# Patient Record
Sex: FEMALE | Race: WHITE | Employment: UNEMPLOYED | URBAN - NONMETROPOLITAN AREA
[De-identification: names, ages, dates, MRNs, and addresses within clinical notes are randomized per-mention and may not be internally consistent; named-entity substitution may affect disease eponyms.]

---

## 2017-01-02 ENCOUNTER — OFFICE VISIT - GICH (OUTPATIENT)
Dept: FAMILY MEDICINE | Facility: OTHER | Age: 22
End: 2017-01-02

## 2017-01-02 ENCOUNTER — HISTORY (OUTPATIENT)
Dept: FAMILY MEDICINE | Facility: OTHER | Age: 22
End: 2017-01-02

## 2017-01-02 DIAGNOSIS — J02.9 ACUTE PHARYNGITIS: ICD-10-CM

## 2017-01-02 DIAGNOSIS — J00 ACUTE NASOPHARYNGITIS (COMMON COLD): ICD-10-CM

## 2017-01-02 LAB — STREP A ANTIGEN - HISTORICAL: NEGATIVE

## 2017-01-24 ENCOUNTER — HISTORY (OUTPATIENT)
Dept: EMERGENCY MEDICINE | Facility: OTHER | Age: 22
End: 2017-01-24

## 2017-02-26 ENCOUNTER — OFFICE VISIT - GICH (OUTPATIENT)
Dept: FAMILY MEDICINE | Facility: OTHER | Age: 22
End: 2017-02-26

## 2017-02-26 ENCOUNTER — HISTORY (OUTPATIENT)
Dept: FAMILY MEDICINE | Facility: OTHER | Age: 22
End: 2017-02-26

## 2017-02-26 ENCOUNTER — HOSPITAL ENCOUNTER (OUTPATIENT)
Dept: RADIOLOGY | Facility: OTHER | Age: 22
End: 2017-02-26
Attending: NURSE PRACTITIONER

## 2017-02-26 DIAGNOSIS — S52.601A CLOSED FRACTURE OF LOWER END OF RIGHT ULNA: ICD-10-CM
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
## 2017-02-26 DIAGNOSIS — S69.91XA UNSPECIFIED INJURY OF RIGHT WRIST, HAND AND FINGER(S), INITIAL ENCOUNTER: ICD-10-CM

## 2017-02-26 DIAGNOSIS — S52.501A CLOSED FRACTURE OF LOWER END OF RIGHT RADIUS: ICD-10-CM

## 2017-03-06 ENCOUNTER — HISTORY (OUTPATIENT)
Dept: FAMILY MEDICINE | Facility: OTHER | Age: 22
End: 2017-03-06

## 2017-03-06 ENCOUNTER — AMBULATORY - GICH (OUTPATIENT)
Dept: FAMILY MEDICINE | Facility: OTHER | Age: 22
End: 2017-03-06

## 2017-03-06 DIAGNOSIS — S52.201P: ICD-10-CM

## 2017-03-06 DIAGNOSIS — S52.91XP: ICD-10-CM

## 2017-03-06 DIAGNOSIS — Z01.810 ENCOUNTER FOR PREPROCEDURAL CARDIOVASCULAR EXAMINATION: ICD-10-CM

## 2017-03-06 LAB
ABSOLUTE BASOPHILS - HISTORICAL: 0.1 THOU/CU MM
ABSOLUTE EOSINOPHILS - HISTORICAL: 0.2 THOU/CU MM
ABSOLUTE LYMPHOCYTES - HISTORICAL: 2.7 THOU/CU MM (ref 0.9–2.9)
ABSOLUTE MONOCYTES - HISTORICAL: 1.1 THOU/CU MM
ABSOLUTE NEUTROPHILS - HISTORICAL: 7.3 THOU/CU MM (ref 1.7–7)
BASOPHILS # BLD AUTO: 0.8 %
EOSINOPHIL NFR BLD AUTO: 1.3 %
ERYTHROCYTE [DISTWIDTH] IN BLOOD BY AUTOMATED COUNT: 12.4 % (ref 11.5–15.5)
HCG UR QL: NEGATIVE
HCT VFR BLD AUTO: 40.2 % (ref 33–51)
HEMOGLOBIN: 13.9 G/DL (ref 12–16)
LYMPHOCYTES NFR BLD AUTO: 24 % (ref 20–44)
MCH RBC QN AUTO: 31 PG (ref 26–34)
MCHC RBC AUTO-ENTMCNC: 34.5 G/DL (ref 32–36)
MCV RBC AUTO: 90 FL (ref 80–100)
MONOCYTES NFR BLD AUTO: 9.6 %
NEUTROPHILS NFR BLD AUTO: 64.2 % (ref 42–72)
PLATELET # BLD AUTO: 325 THOU/CU MM (ref 140–440)
PMV BLD: 6.8 FL (ref 6.5–11)
RED BLOOD COUNT - HISTORICAL: 4.47 MIL/CU MM (ref 4–5.2)
WHITE BLOOD COUNT - HISTORICAL: 11.4 THOU/CU MM (ref 4.5–11)

## 2017-03-06 ASSESSMENT — PATIENT HEALTH QUESTIONNAIRE - PHQ9: SUM OF ALL RESPONSES TO PHQ QUESTIONS 1-9: 14

## 2017-03-09 ENCOUNTER — AMBULATORY - GICH (OUTPATIENT)
Dept: SCHEDULING | Facility: OTHER | Age: 22
End: 2017-03-09

## 2017-05-17 ENCOUNTER — COMMUNICATION - GICH (OUTPATIENT)
Dept: FAMILY MEDICINE | Facility: OTHER | Age: 22
End: 2017-05-17

## 2017-06-03 ENCOUNTER — HISTORY (OUTPATIENT)
Dept: EMERGENCY MEDICINE | Facility: OTHER | Age: 22
End: 2017-06-03

## 2017-07-03 ENCOUNTER — HISTORY (OUTPATIENT)
Dept: EMERGENCY MEDICINE | Facility: OTHER | Age: 22
End: 2017-07-03

## 2018-01-02 NOTE — NURSING NOTE
Patient Information     Patient Name MRN Meeta Tavarez [id number] Female 1995      Nursing Note by Gabby Barnes at 2017  2:45 PM     Author:  Gabby Barnes Service:  (none) Author Type:  (none)     Filed:  2017  1:54 PM Encounter Date:  2017 Status:  Signed     :  Gabby Barnes            Patient presents to clinic with cough, sore throat, fever (yesterday and day prior), laryngitis.  Onset 1 wk.  Pt is requesting RST today.  Both room mates are sick as well.  Gabby Barnes, MADDY ....................  2017   1:50 PM.

## 2018-01-02 NOTE — PATIENT INSTRUCTIONS
Patient Information     Patient Name MRMeeta Rahman [id number] Female 1995      Patient Instructions by Nikole Lara NP at 2017  2:45 PM     Author:  Nikole Lara NP Service:  (none) Author Type:  PHYS- Nurse Practitioner     Filed:  2017  2:14 PM Encounter Date:  2017 Status:  Signed     :  Nikole Lara NP (PHYS- Nurse Practitioner)            Cold Medicines   What are cold medicines?  Symptoms of the common cold start gradually over several days and usually last about two weeks. Symptoms may include sneezing, a stuffy or runny nose, sore throat, cough, watery eyes, mild headache, or body aches. A cold will go away on its own without treatment. However, there are many nonprescription products that may help relieve some of the symptoms of a cold. Cold medicines often contain more than one ingredient and are used to treat more than one symptom. Read the labels and buy products that have only the ingredients that you need. If you are not sure which medicine is best, ask your pharmacist.  How do they work?  Decongestants reduce swelling in your nose and sinuses. They may also lessen the amount of mucus made by your nose. If you use decongestants more often than directed, your stuffy nose may get worse.   Antihistamines block the effect of histamine. Histamine is a chemical your body makes when you have an allergic reaction. Antihistamines are most often used to treat itchy or watery eyes or a stuffy or runny nose caused by an allergy. Antihistamines may not help a stuffy or runny nose caused by a cold because they can make mucus thick and dry.  Mucolytics are medicines that make mucus thinner so that it is easier to cough up out of your throat and lungs.  Expectorants are cough medicines that may help to keep the mucus thin and bring up mucus from the lungs when you cough. This may relieve chest congestion and make it easier to breathe.   Cough suppressants  (antitussives) are medicines that lessen the urge to cough. They may give relief from a dry, hacking cough. If you have a cough that is wet sounding and produces mucus, it is important for you to cough the mucus up out of your lungs. For this reason, cough suppressants are not recommended for a wet sounding cough.  Fever and pain relievers, such as acetaminophen, aspirin, or other nonsteroidal anti-inflammatory drugs (NSAIDs), are often included in cold medicine. Read labels carefully to avoid taking more medicine than you need.  What else do I need to know about this medicine?    Talk to your healthcare provider if your symptoms start suddenly or you have severe symptoms. This may mean you have something more serious than a cold.    Follow the directions that come with your medicine, including information about food or alcohol. Make sure you know how and when to take your medicine. Do not take more or less than you are supposed to take.    Try to get all of your medicine at the same place. Your pharmacist can help make sure that all of your medicines are safe to take together.    Keep a list of your medicines with you. List all of the prescription medicines, nonprescription medicines, supplements, natural remedies, and vitamins that you take. Tell all healthcare providers who treat you about all of the products you are taking.    Many medicines have side effects. A side effect is a symptom or problem that is caused by the medicine. Ask your healthcare provider or pharmacist what side effects the medicine may cause and what you should do if you have side effects.    Nonsteroidal anti-inflammatory medicines (NSAIDs), such as ibuprofen, naproxen, and aspirin, may cause stomach bleeding and other problems. These risks increase with age. Read the label and take as directed. Unless recommended by your healthcare provider, do not take for more than 10 days for any reason.    Acetaminophen may cause liver damage or other  problems. Unless recommended by your provider, don't take more than 3000 milligrams (mg) in 24 hours. To make sure you don t take too much, check other medicines you take to see if they also contain acetaminophen. Ask your provider if you need to avoid drinking alcohol while taking this medicine.  If you have any questions, ask your healthcare provider or pharmacist for more information. Be sure to keep all appointments for provider visits or tests.

## 2018-01-03 NOTE — NURSING NOTE
Patient Information     Patient Name MRMeeta Rahman [id number] Female 1995      Nursing Note by Cecile Lakhani at 2017 10:30 AM     Author:  Cecile Lakhani Service:  (none) Author Type:  (none)     Filed:  2017 11:37 AM Encounter Date:  2017 Status:  Signed     :  Cecile Lakhani            Patient presents for a right wrist injury. States that she fell in the ice on Friday landing on right hand  Cecile Lakhani LPN   2017  11:29 AM

## 2018-01-03 NOTE — PATIENT INSTRUCTIONS
Patient Information     Patient Name MRN Meeta Tavarez [id number] Female 1995      Patient Instructions by Rosio Rashid PA-C at 3/6/2017 12:45 PM     Author:  Rosio Rashid PA-C Service:  (none) Author Type:  PHYS- Physician Assistant     Filed:  3/6/2017  1:56 PM Encounter Date:  3/6/2017 Status:  Signed     :  Rosio Rashid PA-C (PHYS- Physician Assistant)            Patient should take the following medications the morning of surgery with a small sip of water: hold off on all meds.  Patient was instructed to hold the following medications the morning of surgery: hold off on all meds.     Patient was advised to call our office and the surgical services with any change in condition or new symptoms if they were to develop between today and their surgical date.  Especially any cardiopulmonary symptoms or symptoms concerning for an infection.     Discontinue aspirin, aleve, naproxen and ibuprofen 7 days prior to surgery to reduce bleeding risk.  It is fine to take tylenol the week before surgery.

## 2018-01-03 NOTE — NURSING NOTE
"Patient Information     Patient Name MRMeeta Rahman [id number] Female 1995      Nursing Note by Soumya Royal at 3/6/2017 12:45 PM     Author:  Soumya Royal Service:  (none) Author Type:  (none)     Filed:  3/6/2017  1:44 PM Encounter Date:  3/6/2017 Status:  Signed     :  Soumya Royal            Date of Surgery: 3/9/17  Type of Surgery: fracture repair of right wrist  Surgeon: Dr Banks  Hospital[facility]  [facility]       Fever/Chills or other infectious symptoms in past month: no  >10lb weight loss in past two months: no    Health Care Directive/Code status:  Full Code  Hx of blood transfusions:   (NO)   Td up to date:  10/30/2009  History of VRE/MRSA:  (NO) Date: N/A    Preoperative Evaluation: Obstructive Sleep Apnea screening    S: Snore -  Do you snore loudly? (louder than talking or loud enough to be heard through closed doors)(NO)  T: Tired - Do you often feel tired, fatigued, or sleepy during the daytime?(YES)  O: Observed - Has anyone ever observed you stop breathing during your sleep?(NO)  P: Pressure - Do you have or are you being treated for high blood pressure?(NO)  B: BMI - BMI greater than 35kg/m2?(YES)  A: Age - Age over 50 years old?(NO)  N: Neck - Neck circumference greater than 40 cm?(NO)  G: Gender - Gender: Male?(NO)    Total number of \"YES\" responses:  2    Scoring: Low risk of JAVED 0-2  At Risk of JAVED: >3 High Risk of JAVED: 5-8    Soumya Royal LPN........................3/6/2017  1:22 PM              "

## 2018-01-03 NOTE — PATIENT INSTRUCTIONS
Patient Information     Patient Name MRN Meeta Tavarez [id number] Female 1995      Patient Instructions by Stephanie Sharma NP at 2017 10:30 AM     Author:  Stephanie Sharma NP Service:  (none) Author Type:  PHYS- Nurse Practitioner     Filed:  2017 12:08 PM Encounter Date:  2017 Status:  Signed     :  Stephanie Sharma NP (PHYS- Nurse Practitioner)            The x-ray today showed a fracture of both ulnar bone and radius bone. Radiologist will review the X-ray within 24-48 hours, I will contact you if the radiologist finds anything of significance on the x-ray that I did not see.    You will likely need surgery.    You will need to see orthopedics as soon as possible.    Rest the right hand, wrist, avoid any activity which causes pain.  NO ACTIVITY with right.    Apply cold packs to the affected area for 15-20 minutes, 4 times a day. A bag of frozen corn or peas often works well as a cold pack. A cold pack is usually the best treatment for the 1st 2 days after an injury. After 48 hours, apply heat or ice, whichever gives relief.    If the fingers beyond the Ace wrap is swollen, cool or darker in color than the opposite side, the bandage might be too tight.    Leave splint on at all times     Elevate the injured area as much as you are able. If you can get this higher than the heart, this will help minimize pain and swelling.    Also, Take ibuprofen (Advil, Motrin) or naproxen (Aleve), or a similar prescription medication. Use regularly for the first 7-10 days. Later, take as needed for pain, swelling or stiffness. Take this type of medication with food to minimize any stomach irritation.     Hydrocodone 1 tab every 4 hours as needed for severe pain    Call or return to clinic as needed if your pain becomes significantly worse, or fails to improve as anticipated despite following the above recommendations.

## 2018-01-03 NOTE — H&P
"Patient Information     Patient Name MRN Meeta Tavarez [id number] Female 1995      H&P by Rosio Rashid PA-C at 3/6/2017 12:45 PM     Author:  Rosio Rashid PA-C Service:  (none) Author Type:  PHYS- Physician Assistant     Filed:  3/6/2017  3:25 PM Encounter Date:  3/6/2017 Status:  Signed     :  Rosio Rashid PA-C (JERRY- Physician Assistant)            ----------------- PREOPERATIVE EXAM ------------------  3/6/2017    SUBJECTIVE:  Meeta Palacios is a 21 y.o. female here for preoperative optimization.    I was asked to see Meeta Palacios by Dr. Banks for a preoperative optimization due to general health and anesthesia risk.     Patient has a history of right wrist fracture. Bilateral radius and ulna fracture on 2017.   Otherwise feeling well. No fevers, chills, chest pain, palpitations, problems breathing, GI symptoms, urinary symptoms.     Nursing Notes:   Soumya Royal  3/6/2017  1:44 PM  Signed  Date of Surgery: 3/9/17  Type of Surgery: fracture repair of right wrist  Surgeon: Dr Banks  Hospital:  [facility]       Fever/Chills or other infectious symptoms in past month: no  >10lb weight loss in past two months: no    Health Care Directive/Code status:  Full Code  Hx of blood transfusions:   (NO)   Td up to date:  10/30/2009  History of VRE/MRSA:  (NO) Date: N/A    Preoperative Evaluation: Obstructive Sleep Apnea screening    S: Snore -  Do you snore loudly? (louder than talking or loud enough to be heard through closed doors)(NO)  T: Tired - Do you often feel tired, fatigued, or sleepy during the daytime?(YES)  O: Observed - Has anyone ever observed you stop breathing during your sleep?(NO)  P: Pressure - Do you have or are you being treated for high blood pressure?(NO)  B: BMI - BMI greater than 35kg/m2?(YES)  A: Age - Age over 50 years old?(NO)  N: Neck - Neck circumference greater than 40 cm?(NO)  G: Gender - Gender: Male?(NO)    Total number of \"YES\" " responses:  2    Scoring: Low risk of JAVED 0-2  At Risk of JAVED: >3 High Risk of JAVED: 5-8    Soumya Royal LPN........................3/6/2017  1:22 PM          Patient Active Problem List      Diagnosis Date Noted     VERTIGO 08/31/2010     ADHD 03/14/2006       Past Medical History     Diagnosis  Date     ADHD 3/14/2006       Past Surgical History       Procedure   Laterality Date     Pulpotomy        root canal, and crown.        Hand finger surgery   2010     right pinky, fracture at growth plate         Current Outpatient Prescriptions       Medication  Sig Dispense Refill     HYDROcodone-acetaminophen, 7.5-325 mg, (NORCO 7.5-325) 7.5-325 mg per tablet Take 1 tablet by mouth every 4 hours if needed  for Pain Max acetaminophen dose: 4000mg in 24 hrs. 20 tablet 0     No current facility-administered medications for this visit.      Medications have been reviewed by me and are current to the best of my knowledge and ability.    Recent use of: no recent use of aspirin (ASA) or steroids. Last intake of ibuprofen was on 3/6/2017. Encouraged to hold further use of ibuprofen prior to surgery.      Allergies:  Allergies     Allergen  Reactions     Latex Rash       Latex allergy  yes   Tape allergy: no    Family History       Problem   Relation Age of Onset     Other  Mother      headaches with visual changes, dizziness, nausea, and fatigue       Hypertension  Maternal Grandfather      Other  Maternal Grandfather      high cholesterol         Denies family hx of bleeding tendencies, anesthesia complications, or other problems with surgery.      Social History        Substance Use Topics          Smoking status:   Current Some Day Smoker      Types:  Cigarettes      Smokeless tobacco:   Never Used      Alcohol use   3.6 oz/week     0 Standard drinks or equivalent, 3 Glasses of wine, 3 Shots of liquor per week        Comment: monthly           ROS:    surgical:  patient denies previous complications from prior surgeries  "including but not limited to prolonged bleeding, anesthesia complications, dysrhythmias, surgical wound infections, or prolonged hospital stay.      REVIEW OF SYSTEMS:  A comprehensive review of systems was negative except for items noted in HPI/Subjective.    Constitutional: Negative for fever , chills  and fatigue .   Eyes: Negative for irritation  and redness .  Ears, nose, mouth, throat, and face: Negative for ear drainage , nasal congestion , sore throat and hoarseness .  Respiratory: Negative for cough, sputum production , hemoptysis, dyspnea  and wheezing .  Cardiovascular: Negative for chest discomfort, palpitations and lightheadedness .  Gastrointestinal: Negative for dysphagia , nausea , vomiting , melena , diarrhea , constipation  and abdominal pain.  Genitourinary:Negative for frequency, dysuria , urinary incontinence , hematuria.  Integument/breast: Negative for rash .   Hematologic/lymphatic: Negative for easy bruising , bleeding, lymphadenopathy  and petechiae.   Musculoskeletal:Negative for myalgias and arthralgias .  Neurological: Negative for headaches, dizziness , vertigo , seizures  and weakness.   Psychiatric: Negative for anxiety , depression , panic attacks  and suicidal ideations .        -------------------------------------------------------------    PHYSICAL EXAM:  /66  Pulse 76  Temp 99.1  F (37.3  C) (Tympanic)  Ht 1.537 m (5' 0.5\")  Wt 92.5 kg (204 lb)  LMP 02/17/2017  SpO2 96%  Breastfeeding? No  BMI 39.19 kg/m2    EXAM:  General Appearance: Pleasant, alert, appropriate appearance for age. No acute distress  Head Exam: Normal. Normocephalic, atraumatic.  Eye Exam: Normal external eye, conjunctiva, lids, cornea. BINU.  Ear Exam: Normal TM's bilaterally. Normal auditory canals and external ears. Non-tender.  Nose Exam: Normal external nose, mucus membranes, and septum.  OroPharynx Exam: Dental hygiene adequate. Normal buccal mucosa. Normal pharynx.  Neck Exam: Supple, no masses or " nodes., no lymphadenopathy  Thyroid Exam: Normal., No nodules or enlargement., normal to palpation  Chest/Respiratory Exam: Normal chest wall and respirations. Clear to auscultation.  Cardiovascular Exam: Regular rate and rhythm. S1, S2, no murmur, click, gallop, or rubs.  Gastrointestinal Exam: Soft, nontender, no abnormal masses or organomegaly. BS x 4.  Lymphatic Exam: Normal.  Musculoskeletal Exam: Back is straight and non-tender, full ROM of left upper and bilateral lower extremities. Right upper extremity is in a cast.   Skin: no rash or abnormalities  Neurologic Exam:symmetric DTRs, normal gross motor movement, tone, and coordination. No tremor.  Psychiatric Exam: Alert and oriented, appropriate affect.    PHQ Depression Screen  Date of PHQ exam: 17  Over the last 2 weeks, how often have you been bothered by any of the following problems?  1. Little interest or pleasure in doing things: 0 - Not at all  2. Feeling down, depressed, or hopeless: 2 - More than half the days  3. Trouble falling or staying asleep, or sleeping too much: 2 - More than half the days  4. Feeling tired or having little energy: 1 - Several days  5. Poor appetite or overeatin - Several days  6. Feeling bad about yourself - or that you are a failure or have let yourself or your family down: 3 - Nearly every day  7. Trouble concentrating on things, such as reading the newspaper or watching television: 2 - More than half the days  8. Moving or speaking so slowly that other people could have noticed. Or the opposite - being so fidgety or restless that you have been moving around a lot more than usual: 2 - More than half the days  9. Thoughts that you would be better off dead, or of hurting yourself in some way: 1 - Several days    PHQ-9 TOTAL SCORE: (!) 14  Depression Severity Level: moderate  If any answers were positive, how difficult have these problems made it for you to do your work, take care of things at home, or get along  with other people: somewhat difficult    Patient can walk up a flight of stairs without becoming short of breath or having chest pain: YES   Patient is able to tolerate greater than 4 METs of activity without any cardiopulmonary symptoms.    LABS:    Results for orders placed or performed in visit on 03/06/17       PREGNANCY URINE       Result  Value Ref Range Status    PREGNANCY,URINE           Negative Negative Final   CBC WITH AUTO DIFFERENTIAL       Result  Value Ref Range Status    WHITE BLOOD COUNT         11.4 (H) 4.5 - 11.0 thou/cu mm Final    RED BLOOD COUNT           4.47 4.00 - 5.20 mil/cu mm Final    HEMOGLOBIN                13.9 12.0 - 16.0 g/dL Final    HEMATOCRIT                40.2 33.0 - 51.0 % Final    MCV                       90 80 - 100 fL Final    MCH                       31.0 26.0 - 34.0 pg Final    MCHC                      34.5 32.0 - 36.0 g/dL Final    RDW                       12.4 11.5 - 15.5 % Final    PLATELET COUNT            325 140 - 440 thou/cu mm Final    MPV                       6.8 6.5 - 11.0 fL Final    NEUTROPHILS               64.2 42.0 - 72.0 % Final    LYMPHOCYTES               24.0 20.0 - 44.0 % Final    MONOCYTES                 9.6 <12.0 % Final    EOSINOPHILS               1.3 <8.0 % Final    BASOPHILS                 0.8 <3.0 % Final    ABSOLUTE NEUTROPHILS      7.3 (H) 1.7 - 7.0 thou/cu mm Final    ABSOLUTE LYMPHOCYTES      2.7 0.9 - 2.9 thou/cu mm Final    ABSOLUTE MONOCYTES        1.1 (H) <0.9 thou/cu mm Final    ABSOLUTE EOSINOPHILS      0.2 <0.5 thou/cu mm Final    ABSOLUTE BASOPHILS        0.1 <0.3 thou/cu mm Final         CXR:  Not necessary    EKG:  Not necessary  ---------------------------------------------------------------    No family history of problems with bleeding or anesthetia.    ASA PS class 1. Patient's perioperative risk is minimized and no further cardiopulmonary workup is neccesary.  Please contact the office with any questions or  concerns.      ICD-10-CM    1. Preop cardiovascular exam Z01.810 CBC AND DIFFERENTIAL      PREGNANCY URINE      HYDROcodone-acetaminophen, 7.5-325 mg, (NORCO 7.5-325) 7.5-325 mg per tablet      CBC AND DIFFERENTIAL      PREGNANCY URINE      CBC WITH AUTO DIFFERENTIAL   2. Fracture of right radius and ulna, closed, with malunion, subsequent encounter S52.201P CBC AND DIFFERENTIAL     S52.91XP PREGNANCY URINE      HYDROcodone-acetaminophen, 7.5-325 mg, (NORCO 7.5-325) 7.5-325 mg per tablet      CBC AND DIFFERENTIAL      PREGNANCY URINE      CBC WITH AUTO DIFFERENTIAL       ASSESSEMNT AND PLAN:  1.  Preoperative history and physical   consults:  None  Patient is approved for the surgery.  No concerns.     For above listed surgery and anesthesia:     - Patient is low  risk for perioperative complications.     Patient was administered the following vaccines today: none.  Completed labs today: CBC, UPT, no concerns.    Refilled norco 7.5/325 today for a quantity of 20. Further refills and pain management need to be completed by surgeon.     PRE OP RECOMMENDATIONS:  Patient is on chronic pain medications (NO)   Patient is on antiplatlet/anticoagulation (NO)   Other medications that need adjustment perioperatively (NO)     Patient Instructions   Patient should take the following medications the morning of surgery with a small sip of water: hold off on all meds.  Patient was instructed to hold the following medications the morning of surgery: hold off on all meds.     Patient was advised to call our office and the surgical services with any change in condition or new symptoms if they were to develop between today and their surgical date.  Especially any cardiopulmonary symptoms or symptoms concerning for an infection.     Discontinue aspirin, aleve, naproxen and ibuprofen 7 days prior to surgery to reduce bleeding risk.  It is fine to take tylenol the week before surgery.        Other:  Patient was advised to call our office  and the surgical services with any change in condition or new symptoms if they were to develop between today and their surgical date.  Especially any cardiopulmonary symptoms or symptoms concerning for an infection.     PRE OP RECOMMENDATIONS:  Discontinue ASA 7 days prior to reduce bleeding risk and Discontinue NSAIDS 7 days prior to procedure to reduce bleeding risk    Greater than 25 minutes were spent in counseling and coordination of care.    Rosio Rashid PA-C..................3/6/2017 1:44 PM

## 2018-01-03 NOTE — PROGRESS NOTES
Patient Information     Patient Name MRN Sex Meeta Calero [id number] Female 1995      Progress Notes by Stephanie Sharma NP at 2017 10:30 AM     Author:  Stephanie Sharma NP Service:  (none) Author Type:  PHYS- Nurse Practitioner     Filed:  2017  1:33 PM Encounter Date:  2017 Status:  Signed     :  Stephanie Sharma NP (PHYS- Nurse Practitioner)            HPI:    Meeta Palacios is a 21 y.o. female who presents to clinic today for right wrist pain/injury.   States she slipped and fell backwards on ice 2 days ago, landed on right hand.  Lots of swelling and bruising.  Pain is severe.  Pain in hand, wrist and forearm.  Tingling.  No numbness.  Unable to use the right hand or move the right hand/wrist.  Right handed.   Denies any other injuries.  Iced some.  Some elevation.  Taking Ibuprofen 600 mg every 6 hours without relief.            Past Medical History    Diagnosis Date     Past Surgical History       Procedure   Laterality Date     Pulpotomy        root canal, and crown.        Hand finger surgery   2010     right pinky, fracture at growth plate       Social History      Substance Use Topics        Smoking status:  Current Every Day Smoker     Types: Cigarettes     Smokeless tobacco:  Never Used     Alcohol use  No     No current outpatient prescriptions on file.     No current facility-administered medications for this visit.      Medications have been reviewed by me and are current to the best of my knowledge and ability.    No Known Allergies    ROS:  Refer to HPI    Visit Vitals       /72     Pulse 88     Wt 91.6 kg (202 lb)     LMP 2017     Breastfeeding No       EXAM:  General Appearance: Miserable appearing female adolescent, appropriate appearance for age. No acute distress  Respiratory:   Normal effort.   Cardiovascular:  CMS intact to right upper extremity, brisk capillary refill (immediate), strong radial pulse   Musculoskeletal:  Right dorsal  hand with generalized edema and swelling.  Right wrist and distal forearm with generalized swelling and edema.  Right fingers puffy.  Generalized tenderness to palpation over right hand, wrist and forearm.  Able to wiggle fingers with encouragement.  No active ROM of right wrist due to guarding and pain.  Right elbow without swelling or tenderness.  Normal ROM of right elbow.    Dermatological: dorsal hand with generalized bruising.  Right wrist palmar side with bruising along radial side.  Right wrist dorsal side with bruising along ulnar side.  Psychological: normal affect, alert and pleasant    Exam: XR WRIST 3 VIEWS RIGHT  History: Right wrist injury, initial encounter  Technique: 3 views.  Findings: There is a transverse impacted fracture through the distal radius with mild dorsal displacement of the distal radial articular surface. There is no significant angulation.  There is also a mildly distracted fracture through the ulnar styloid.  No other abnormality is seen.  Impression: Acute fractures of the distal radius and ulna.  Electronically Signed By: Yin Woodward M.D. on 2/26/2017 12:33 PM      ASSESSMENT/PLAN:    ICD-10-CM    1. Right wrist injury, initial encounter S69.91XA ketorolac 60 mg injection (TORADOL)      XR WRIST 3 VIEWS RIGHT   2. Fall from slipping on ice, initial encounter W00.9XXA    3. Radius/ulna fracture, right, closed, initial encounter S52.501A AMB CONSULT TO ORTHOPEDICS - AFFILIATE ONLY     S52.601A HYDROcodone-acetaminophen, 7.5-325 mg, (NORCO 7.5-325) 7.5-325 mg per tablet      SLING         Xray of right wrist completed and reviewed, radial and ulnar fracture noted, radiologist confirmed  Manual traction applied during splinting of right wrist with long arm sugar tong splint with thumb and index finger tilted radially and wrist straight  Sling for comfort and elevation.  Leave splint in place at all times.    Stressed importance of elevation to reduce swelling  Discussed  warning signs/symptoms such as cyanosis, numbness, inability to move fingers, splint too tight, etc  - instructed to go to ER if occur  Norco 7.5/325 mg 1 tab Q 4 hours PRN (20 tabs, no refills)  Naproxen or  ibuprofen TID PRN  Referral to surgical orthopedics - needs to be seen in 1-2 days          Patient Instructions   The x-ray today showed a fracture of both ulnar bone and radius bone. Radiologist will review the X-ray within 24-48 hours, I will contact you if the radiologist finds anything of significance on the x-ray that I did not see.    You will likely need surgery.    You will need to see orthopedics as soon as possible.    Rest the right hand, wrist, avoid any activity which causes pain.  NO ACTIVITY with right.    Apply cold packs to the affected area for 15-20 minutes, 4 times a day. A bag of frozen corn or peas often works well as a cold pack. A cold pack is usually the best treatment for the 1st 2 days after an injury. After 48 hours, apply heat or ice, whichever gives relief.    If the fingers beyond the Ace wrap is swollen, cool or darker in color than the opposite side, the bandage might be too tight.    Leave splint on at all times     Elevate the injured area as much as you are able. If you can get this higher than the heart, this will help minimize pain and swelling.    Also, Take ibuprofen (Advil, Motrin) or naproxen (Aleve), or a similar prescription medication. Use regularly for the first 7-10 days. Later, take as needed for pain, swelling or stiffness. Take this type of medication with food to minimize any stomach irritation.     Hydrocodone 1 tab every 4 hours as needed for severe pain    Call or return to clinic as needed if your pain becomes significantly worse, or fails to improve as anticipated despite following the above recommendations.

## 2018-01-05 NOTE — TELEPHONE ENCOUNTER
Patient Information     Patient Name MRN Meeta Tavarez [id number] Female 1995      Telephone Encounter by Sonia Max MD at 2017  5:08 PM     Author:  Sonia Max MD Service:  (none) Author Type:  Physician     Filed:  2017  5:08 PM Encounter Date:  2017 Status:  Signed     :  Sonia Max MD (Physician)            She should follow up with a surgeon.  Sonia Max MD

## 2018-01-05 NOTE — TELEPHONE ENCOUNTER
Patient Information     Patient Name MRN Meeta Tavarez [id number] Female 1995      Telephone Encounter by Jayne Torres at 2017  2:29 PM     Author:  Jayne Torres Service:  (none) Author Type:  (none)     Filed:  2017  2:30 PM Encounter Date:  2017 Status:  Signed     :  Jayne Torres            Patient wants okay to see you for her post op . She had right wrist surgery with DR. Banks .  Jayne Torres LPN ....................2017  2:30 PM

## 2018-01-05 NOTE — TELEPHONE ENCOUNTER
Patient Information     Patient Name Meeta Rhodes [id number] Female 1995      Telephone Encounter by Cande Sharma at 2017 10:55 AM     Author:  Cande Sharma Service:  (none) Author Type:  (none)     Filed:  2017 10:55 AM Encounter Date:  2017 Status:  Signed     :  Cande Sharma            Talked to patient and she is aware that she is to see her surgeon.   Cande Sharma LPN..............2017 10:55 AM

## 2018-01-27 VITALS
SYSTOLIC BLOOD PRESSURE: 128 MMHG | HEART RATE: 76 BPM | OXYGEN SATURATION: 96 % | TEMPERATURE: 99.1 F | DIASTOLIC BLOOD PRESSURE: 66 MMHG | BODY MASS INDEX: 38.51 KG/M2 | WEIGHT: 204 LBS | HEIGHT: 61 IN

## 2018-01-27 VITALS
DIASTOLIC BLOOD PRESSURE: 76 MMHG | WEIGHT: 201 LBS | BODY MASS INDEX: 37.95 KG/M2 | TEMPERATURE: 99.2 F | HEART RATE: 104 BPM | OXYGEN SATURATION: 98 % | HEIGHT: 61 IN | RESPIRATION RATE: 20 BRPM | SYSTOLIC BLOOD PRESSURE: 118 MMHG

## 2018-01-27 VITALS — WEIGHT: 202 LBS | DIASTOLIC BLOOD PRESSURE: 72 MMHG | SYSTOLIC BLOOD PRESSURE: 108 MMHG | HEART RATE: 88 BPM

## 2018-02-04 ASSESSMENT — PATIENT HEALTH QUESTIONNAIRE - PHQ9: SUM OF ALL RESPONSES TO PHQ QUESTIONS 1-9: 14

## 2018-02-12 ENCOUNTER — DOCUMENTATION ONLY (OUTPATIENT)
Dept: FAMILY MEDICINE | Facility: OTHER | Age: 23
End: 2018-02-12

## 2018-02-12 PROBLEM — Z34.80 PRENATAL CARE OF MULTIGRAVIDA, ANTEPARTUM: Status: ACTIVE | Noted: 2018-01-05

## 2018-07-23 NOTE — PROGRESS NOTES
Patient Information     Patient Name  Meeta Palacios MRN  [id number] Sex  Female   1995      Letter by Rosio Rashid PA-C at      Author:  Rosio Rashid PA-C Service:  (none) Author Type:  (none)    Filed:   Encounter Date:  3/6/2017 Status:  (Other)         Meeta Palacios  [address]  [address] [facility] [address]    3/6/2017      Dear Ms. Palacios,      We've received the results back from the laboratory for the samples you gave in clinic.  Your labs are normal. Please contact us at [phone] with any questions or concerns that you have.    I attached your lab results for your records.        Take Care,         Rosio Rashid PA-C    Resulted Orders      PREGNANCY URINE (Collected: 3/6/2017  2:16 PM)      Result  Value Ref Range    PREGNANCY,URINE           Negative Negative   CBC WITH AUTO DIFFERENTIAL (Collected: 3/6/2017  2:16 PM)      Result  Value Ref Range    WHITE BLOOD COUNT         11.4 (H) 4.5 - 11.0 thou/cu mm    RED BLOOD COUNT           4.47 4.00 - 5.20 mil/cu mm    HEMOGLOBIN                13.9 12.0 - 16.0 g/dL    HEMATOCRIT                40.2 33.0 - 51.0 %    MCV                       90 80 - 100 fL    MCH                       31.0 26.0 - 34.0 pg    MCHC                      34.5 32.0 - 36.0 g/dL    RDW                       12.4 11.5 - 15.5 %    PLATELET COUNT            325 140 - 440 thou/cu mm    MPV                       6.8 6.5 - 11.0 fL    NEUTROPHILS               64.2 42.0 - 72.0 %    LYMPHOCYTES               24.0 20.0 - 44.0 %    MONOCYTES                 9.6 <12.0 %    EOSINOPHILS               1.3 <8.0 %    BASOPHILS                 0.8 <3.0 %    ABSOLUTE NEUTROPHILS      7.3 (H) 1.7 - 7.0 thou/cu mm    ABSOLUTE LYMPHOCYTES      2.7 0.9 - 2.9 thou/cu mm    ABSOLUTE MONOCYTES        1.1 (H) <0.9 thou/cu mm    ABSOLUTE EOSINOPHILS      0.2 <0.5 thou/cu mm    ABSOLUTE BASOPHILS        0.1 <0.3 thou/cu mm

## 2018-08-15 ENCOUNTER — TELEPHONE (OUTPATIENT)
Dept: FAMILY MEDICINE | Facility: OTHER | Age: 23
End: 2018-08-15

## 2018-08-15 NOTE — TELEPHONE ENCOUNTER
Michelle from [facility] called requesting a referral for this patients July visits. Michelle did not know why the patient was there, but speculated that she may have moved to that area. She said the patient is/was pregnant and had been seen there in July for OB visits. She said Meeta has since changed insurance, but she had [employer] at that time. DX Codes she provided were Z34.90, O09.893, O99.343 and Z38.33. Will you approve this referral? Thank you, Vira Tenorio on 8/15/2018 at 10:19 AM

## 2018-08-15 NOTE — TELEPHONE ENCOUNTER
No I will not authorize this I have not seen the patient for at least 2 years.  Darleen Delgado MD  12:04 PM 8/15/2018

## 2018-08-15 NOTE — TELEPHONE ENCOUNTER
Left message stating that they would not refer patient because we have not seen the patient for at least 2 years.  Princess Weber LPN .......8/15/2018 12:40 PM

## 2018-08-15 NOTE — TELEPHONE ENCOUNTER
I haven't seen this patient.  Looks like she used to see Darleen Delgado MD.  I will not authorize visits.  Sonia Max MD

## 2018-11-24 ENCOUNTER — HOSPITAL ENCOUNTER (EMERGENCY)
Facility: CLINIC | Age: 23
Discharge: HOME | End: 2018-11-24
Attending: EMERGENCY MEDICINE | Admitting: EMERGENCY MEDICINE
Payer: COMMERCIAL

## 2018-11-24 VITALS
DIASTOLIC BLOOD PRESSURE: 60 MMHG | WEIGHT: 165 LBS | BODY MASS INDEX: 31.15 KG/M2 | HEIGHT: 61 IN | OXYGEN SATURATION: 100 % | SYSTOLIC BLOOD PRESSURE: 107 MMHG | TEMPERATURE: 97.8 F

## 2018-11-24 DIAGNOSIS — G44.219 EPISODIC TENSION-TYPE HEADACHE, NOT INTRACTABLE: ICD-10-CM

## 2018-11-24 DIAGNOSIS — H81.10 BENIGN PAROXYSMAL POSITIONAL VERTIGO, UNSPECIFIED LATERALITY: ICD-10-CM

## 2018-11-24 DIAGNOSIS — R11.0 NAUSEA: ICD-10-CM

## 2018-11-24 LAB
ALBUMIN SERPL-MCNC: 3.9 G/DL (ref 3.4–5)
ALP SERPL-CCNC: 64 U/L (ref 40–150)
ALT SERPL W P-5'-P-CCNC: 30 U/L (ref 0–50)
ANION GAP SERPL CALCULATED.3IONS-SCNC: 5 MMOL/L (ref 3–14)
AST SERPL W P-5'-P-CCNC: 19 U/L (ref 0–45)
BASOPHILS # BLD AUTO: 0.1 10E9/L (ref 0–0.2)
BASOPHILS NFR BLD AUTO: 0.5 %
BILIRUB SERPL-MCNC: 0.3 MG/DL (ref 0.2–1.3)
BUN SERPL-MCNC: 15 MG/DL (ref 7–30)
CALCIUM SERPL-MCNC: 8.3 MG/DL (ref 8.5–10.1)
CHLORIDE SERPL-SCNC: 109 MMOL/L (ref 94–109)
CO2 SERPL-SCNC: 26 MMOL/L (ref 20–32)
CREAT SERPL-MCNC: 0.82 MG/DL (ref 0.52–1.04)
DIFFERENTIAL METHOD BLD: NORMAL
EOSINOPHIL # BLD AUTO: 0.1 10E9/L (ref 0–0.7)
EOSINOPHIL NFR BLD AUTO: 1.3 %
ERYTHROCYTE [DISTWIDTH] IN BLOOD BY AUTOMATED COUNT: 12.3 % (ref 10–15)
GFR SERPL CREATININE-BSD FRML MDRD: 87 ML/MIN/1.7M2
GLUCOSE SERPL-MCNC: 79 MG/DL (ref 70–99)
HCT VFR BLD AUTO: 40.4 % (ref 35–47)
HGB BLD-MCNC: 13.5 G/DL (ref 11.7–15.7)
IMM GRANULOCYTES # BLD: 0 10E9/L (ref 0–0.4)
IMM GRANULOCYTES NFR BLD: 0.2 %
LYMPHOCYTES # BLD AUTO: 3.3 10E9/L (ref 0.8–5.3)
LYMPHOCYTES NFR BLD AUTO: 36 %
MCH RBC QN AUTO: 29.9 PG (ref 26.5–33)
MCHC RBC AUTO-ENTMCNC: 33.4 G/DL (ref 31.5–36.5)
MCV RBC AUTO: 90 FL (ref 78–100)
MONOCYTES # BLD AUTO: 0.8 10E9/L (ref 0–1.3)
MONOCYTES NFR BLD AUTO: 9 %
NEUTROPHILS # BLD AUTO: 4.9 10E9/L (ref 1.6–8.3)
NEUTROPHILS NFR BLD AUTO: 53 %
NRBC # BLD AUTO: 0 10*3/UL
NRBC BLD AUTO-RTO: 0 /100
PLATELET # BLD AUTO: 318 10E9/L (ref 150–450)
POTASSIUM SERPL-SCNC: 3.9 MMOL/L (ref 3.4–5.3)
PROT SERPL-MCNC: 7.9 G/DL (ref 6.8–8.8)
RBC # BLD AUTO: 4.51 10E12/L (ref 3.8–5.2)
SODIUM SERPL-SCNC: 140 MMOL/L (ref 133–144)
WBC # BLD AUTO: 9.2 10E9/L (ref 4–11)

## 2018-11-24 PROCEDURE — 96375 TX/PRO/DX INJ NEW DRUG ADDON: CPT

## 2018-11-24 PROCEDURE — 25000132 ZZH RX MED GY IP 250 OP 250 PS 637: Performed by: EMERGENCY MEDICINE

## 2018-11-24 PROCEDURE — 80053 COMPREHEN METABOLIC PANEL: CPT | Performed by: EMERGENCY MEDICINE

## 2018-11-24 PROCEDURE — 96361 HYDRATE IV INFUSION ADD-ON: CPT

## 2018-11-24 PROCEDURE — 85025 COMPLETE CBC W/AUTO DIFF WBC: CPT | Performed by: EMERGENCY MEDICINE

## 2018-11-24 PROCEDURE — 99283 EMERGENCY DEPT VISIT LOW MDM: CPT | Mod: Z6 | Performed by: EMERGENCY MEDICINE

## 2018-11-24 PROCEDURE — 96374 THER/PROPH/DIAG INJ IV PUSH: CPT

## 2018-11-24 PROCEDURE — 25000128 H RX IP 250 OP 636: Performed by: EMERGENCY MEDICINE

## 2018-11-24 PROCEDURE — 99284 EMERGENCY DEPT VISIT MOD MDM: CPT | Mod: 25

## 2018-11-24 RX ORDER — ONDANSETRON 4 MG/1
4 TABLET, ORALLY DISINTEGRATING ORAL EVERY 8 HOURS PRN
Qty: 10 TABLET | Refills: 0 | Status: SHIPPED | OUTPATIENT
Start: 2018-11-24 | End: 2019-03-01

## 2018-11-24 RX ORDER — MECLIZINE HYDROCHLORIDE 25 MG/1
50 TABLET ORAL ONCE
Status: COMPLETED | OUTPATIENT
Start: 2018-11-24 | End: 2018-11-24

## 2018-11-24 RX ORDER — MECLIZINE HYDROCHLORIDE 25 MG/1
25 TABLET ORAL EVERY 6 HOURS PRN
Qty: 30 TABLET | Refills: 1 | Status: SHIPPED | OUTPATIENT
Start: 2018-11-24 | End: 2019-03-01

## 2018-11-24 RX ORDER — METOCLOPRAMIDE HYDROCHLORIDE 5 MG/ML
10 INJECTION INTRAMUSCULAR; INTRAVENOUS ONCE
Status: COMPLETED | OUTPATIENT
Start: 2018-11-24 | End: 2018-11-24

## 2018-11-24 RX ORDER — KETOROLAC TROMETHAMINE 15 MG/ML
15 INJECTION, SOLUTION INTRAMUSCULAR; INTRAVENOUS ONCE
Status: COMPLETED | OUTPATIENT
Start: 2018-11-24 | End: 2018-11-24

## 2018-11-24 RX ADMIN — KETOROLAC TROMETHAMINE 15 MG: 15 INJECTION, SOLUTION INTRAMUSCULAR; INTRAVENOUS at 20:51

## 2018-11-24 RX ADMIN — METOCLOPRAMIDE 10 MG: 5 INJECTION, SOLUTION INTRAMUSCULAR; INTRAVENOUS at 20:52

## 2018-11-24 RX ADMIN — MECLIZINE HYDROCHLORIDE 50 MG: 25 TABLET ORAL at 20:52

## 2018-11-24 RX ADMIN — SODIUM CHLORIDE 1000 ML: 9 INJECTION, SOLUTION INTRAVENOUS at 20:51

## 2018-11-24 ASSESSMENT — ENCOUNTER SYMPTOMS
FEVER: 0
SHORTNESS OF BREATH: 0
ABDOMINAL PAIN: 0

## 2018-11-24 NOTE — ED AVS SNAPSHOT
[facility] Emergency Department    [address] [facility] [address]    Phone:  [phone]    Fax:  [phone]                                       Meeta Palacios   MRN: [id number]    Department:  [facility] Emergency Department   Date of Visit:  11/24/2018           Patient Information     Date Of Birth          1995        Your diagnoses for this visit were:     Episodic tension-type headache, not intractable     Nausea     Benign paroxysmal positional vertigo, unspecified laterality        You were seen by Jose Briceño MD.        Discharge Instructions       Meclizine as needed for dizziness.    zofran can be given as needed for nausea.          Benign Paroxysmal Positional Vertigo     Your health care provider may move your head in certain ways to treat your BPPV.     Benign paroxysmal positional vertigo (BPPV) is a problem with the inner ear. The inner ear contains the vestibular system. This system is what helps you keep your balance. BPPV causes a feeling of spinning. It is a common problem of the vestibular system.  Understanding the vestibular system  The vestibular system of the ear is made up of very tiny parts. They include the utricle, saccule, and semicircular canals. The utricle is a tiny organ that contains calcium crystals. In some people, the crystals can move into the semicircular canals. When this happens, the system no longer works as it should. This causes BPPV. Benign means it is not life threatening. Paroxysmal means it happens suddenly. Positional means that it happens when you move your head. Vertigo is a feeling of spinning.  What causes BPPV?  Causes include injury to your head or neck. Other problems with the vestibular system may cause BPPV. In many people, the cause of BPPV is not known.  Symptoms of BPPV  You many have repeated feelings of spinning (vertigo). The vertigo usually lasts less than 1 minute. Some movements, such as rolling over in  bed, can bring on vertigo.  Diagnosing BPPV  Your primary healthcare provider may diagnose and treat your BPPV. Or you may see an ear, nose, and throat doctor (otolaryngologist). In some cases, you may see a nervous system doctor (neurologist).  The healthcare provider will ask about your symptoms and your medical history. He or she will examine you. You may have hearing and balance tests. As part of the exam, your healthcare provider may have you move your head and body in certain ways. If you have BPPV, the movements can bring on vertigo. Your provider will also look for abnormal movements of your eyes. You may have other tests to check your vestibular or nervous systems.  Treatment for BPPV  Your healthcare provider may try to move the calcium crystals. This is done by having you move your head and neck in certain ways. This treatment is safe and often works well. You may also be told to do these movements at home. You may still have vertigo for a few weeks. Your healthcare provider will recheck your symptoms, usually in about a month. Special physical therapy may also be part of treatment. In rare cases, surgery may be needed for BPPV that does not go away.     When to call the healthcare provider  Call your healthcare provider right away if you have any of these:    Symptoms that do not go away with treatment    Symptoms that get worse    New symptoms   Date Last Reviewed: 5/1/2017 2000-2018 [employer]. [address] [place] [address]. All rights reserved. This information is not intended as a substitute for professional medical care. Always follow your healthcare professional's instructions.          24 Hour Appointment Hotline       To make an appointment at any [facility], call [phone] ([phone]). If you don't have a family doctor or clinic, we will help you find one. [facility] [place] clinics are conveniently located to serve the needs of you and your family.              Review of your medicines      START taking        Dose / Directions Last dose taken    meclizine 25 MG tablet   Commonly known as:  ANTIVERT   Dose:  25 mg   Quantity:  30 tablet        Take 1 tablet (25 mg) by mouth every 6 hours as needed for dizziness   Refills:  1        ondansetron 4 MG ODT tab   Commonly known as:  ZOFRAN ODT   Dose:  4 mg   Quantity:  10 tablet        Take 1 tablet (4 mg) by mouth every 8 hours as needed for nausea or vomiting   Refills:  0          Our records show that you are taking the medicines listed below. If these are incorrect, please call your family doctor or clinic.        Dose / Directions Last dose taken    IRON PO   Dose:  1 tablet        Take 1 tablet by mouth daily   Refills:  0                Prescriptions were sent or printed at these locations (2 Prescriptions)                   Other Prescriptions                Printed at Department/Unit printer (2 of 2)         meclizine (ANTIVERT) 25 MG tablet               ondansetron (ZOFRAN ODT) 4 MG ODT tab                Procedures and tests performed during your visit     CBC with platelets differential    Comprehensive metabolic panel      Orders Needing Specimen Collection     None      Pending Results     No orders found from 11/22/2018 to 11/25/2018.            Pending Culture Results     No orders found from 11/22/2018 to 11/25/2018.            Pending Results Instructions     If you had any lab results that were not finalized at the time of your Discharge, you can call the ED Lab Result RN at [phone]. You will be contacted by this team for any positive Lab results or changes in treatment. The nurses are available 7 days a week from 10A to 6:30P.  You can leave a message 24 hours per day and they will return your call.        Test Results From Your Hospital Stay        11/24/2018  9:06 PM      Component Results     Component Value Ref Range & Units Status    WBC 9.2 4.0 - 11.0 10e9/L Final    RBC Count 4.51 3.8 - 5.2  10e12/L Final    Hemoglobin 13.5 11.7 - 15.7 g/dL Final    Hematocrit 40.4 35.0 - 47.0 % Final    MCV 90 78 - 100 fl Final    MCH 29.9 26.5 - 33.0 pg Final    MCHC 33.4 31.5 - 36.5 g/dL Final    RDW 12.3 10.0 - 15.0 % Final    Platelet Count 318 150 - 450 10e9/L Final    Diff Method Automated Method  Final    % Neutrophils 53.0 % Final    % Lymphocytes 36.0 % Final    % Monocytes 9.0 % Final    % Eosinophils 1.3 % Final    % Basophils 0.5 % Final    % Immature Granulocytes 0.2 % Final    Nucleated RBCs 0 0 /100 Final    Absolute Neutrophil 4.9 1.6 - 8.3 10e9/L Final    Absolute Lymphocytes 3.3 0.8 - 5.3 10e9/L Final    Absolute Monocytes 0.8 0.0 - 1.3 10e9/L Final    Absolute Eosinophils 0.1 0.0 - 0.7 10e9/L Final    Absolute Basophils 0.1 0.0 - 0.2 10e9/L Final    Abs Immature Granulocytes 0.0 0 - 0.4 10e9/L Final    Absolute Nucleated RBC 0.0  Final         11/24/2018  9:23 PM      Component Results     Component Value Ref Range & Units Status    Sodium 140 133 - 144 mmol/L Final    Potassium 3.9 3.4 - 5.3 mmol/L Final    Chloride 109 94 - 109 mmol/L Final    Carbon Dioxide 26 20 - 32 mmol/L Final    Anion Gap 5 3 - 14 mmol/L Final    Glucose 79 70 - 99 mg/dL Final    Urea Nitrogen 15 7 - 30 mg/dL Final    Creatinine 0.82 0.52 - 1.04 mg/dL Final    GFR Estimate 87 >60 mL/min/1.7m2 Final    Non  GFR Calc    GFR Estimate If Black >90 >60 mL/min/1.7m2 Final    African American GFR Calc    Calcium 8.3 (L) 8.5 - 10.1 mg/dL Final    Bilirubin Total 0.3 0.2 - 1.3 mg/dL Final    Albumin 3.9 3.4 - 5.0 g/dL Final    Protein Total 7.9 6.8 - 8.8 g/dL Final    Alkaline Phosphatase 64 40 - 150 U/L Final    ALT 30 0 - 50 U/L Final    AST 19 0 - 45 U/L Final                Thank you for choosing [facility]       Thank you for choosing [facility] for your care. Our goal is always to provide you with excellent care. Hearing back from our patients is one way we can continue to improve our services. Please take a few  "minutes to complete the written survey that you may receive in the mail after you visit with us. Thank you!        [employer] Information     [employer] lets you send messages to your doctor, view your test results, renew your prescriptions, schedule appointments and more. To sign up, go to [email or web address] . Click on \"Log in\" on the left side of the screen, which will take you to the Welcome page. Then click on \"Sign up Now\" on the right side of the page.     You will be asked to enter the access code listed below, as well as some personal information. Please follow the directions to create your username and password.     Your access code is: [email or web address]  Expires: 2019  9:57 PM     Your access code will  in 90 days. If you need help or a new code, please call your [place] clinic or [phone].        Care EveryWhere ID     This is your Care EveryWhere ID. This could be used by other organizations to access your [place] medical records  [id number]        Equal Access to Services     KAYLEN [facility] CenterJACOB : Hadii cuca [place], waaxda luqadaha, qaybta kaalmarayshawn trujillo, melina dacosta . [facility] [phone].    ATENCIÓN: Si habla español, tiene a acuna disposición servicios gratuitos de asistencia lingüística. Llame al [phone].    We comply with applicable federal civil rights laws and [place] laws. We do not discriminate on the basis of race, color, national origin, age, disability, sex, sexual orientation, or gender identity.            After Visit Summary       This is your record. Keep this with you and show to your community pharmacist(s) and doctor(s) at your next visit.                  "

## 2018-11-24 NOTE — ED AVS SNAPSHOT
[facility] Emergency Department    [address] [address]    Phone:  [phone]    Fax:  [phone]                                       Meeta Palacios   MRN: [id number]    Department:  [facility] Emergency Department   Date of Visit:  11/24/2018           After Visit Summary Signature Page     I have received my discharge instructions, and my questions have been answered. I have discussed any challenges I see with this plan with the nurse or doctor.    ..........................................................................................................................................  Patient/Patient Representative Signature      ..........................................................................................................................................  Patient Representative Print Name and Relationship to Patient    ..................................................               ................................................  Date                                   Time    ..........................................................................................................................................  Reviewed by Signature/Title    ...................................................              ..............................................  Date                                               Time          22EPIC Rev 08/18

## 2018-11-25 NOTE — ED NOTES
Patient presents with headache and floaters. Had pre eclampsia with infant  Patient is alert oriented   States nothing is working on headache   States she has headaches every day   B/P within normal range    at bedside with infant

## 2018-11-25 NOTE — ED PROVIDER NOTES
History     Chief Complaint   Patient presents with     Headache     dizzy and lightheaded     Eye Problem     seeing floaters     HPI  Meeta Palacios is a 23 year old female who has past medical history significant for preeclampsia during the peripartum timeframe, with delivery slightly greater than 3 months ago, presenting to the emergency department with complaints of moderate severity generalized headache, in addition to nausea which has been occurring over the past 2-3 days.  Has taken Tylenol, and ibuprofen without alleviation of the pain.  Denies fever, or chills.  No extremity weakness.  She does report that occasionally she will stand up, and have room spinning type of sensation.  No chest pain, abdominal pain, vaginal bleeding, or vaginal discharge.  Patient is not currently breast-feeding.  Taking no daily medications.  Has not had a history of prior severe migraine headaches.    Problem List:    Patient Active Problem List    Diagnosis Date Noted     Prenatal care of multigravida, antepartum 01/05/2018     Priority: Medium     Overview:   SPK - FOB  Involved  Hx of SAB in 1/2017    GCT:  HGB:  GBS:  TDAP:   RHOGAM:  US:       Vertigo 08/31/2010     Priority: Medium     ADHD 03/14/2006     Priority: Medium        Past Medical History:    Past Medical History:   Diagnosis Date     Attention-deficit hyperactivity disorder        Past Surgical History:    Past Surgical History:   Procedure Laterality Date     FINGER SURGERY      2010,right pinky, fracture at growth plate     OTHER SURGICAL HISTORY      [id number],OTHER,root canal, and crown.       Family History:    Family History   Problem Relation Age of Onset     Other - See Comments Mother      headaches with visual changes, dizziness, nausea, and fatigue     Hypertension Maternal Grandfather      Hypertension     Other - See Comments Maternal Grandfather      high cholesterol       Social History:  Marital Status:  Single [1]  Social History   Substance  "Use Topics     Smoking status: Current Some Day Smoker     Packs/day: 0.25     Types: Cigarettes     Smokeless tobacco: Never Used     Alcohol use 3.6 oz/week      Comment: Alcoholic Drinks/day: monthly        Medications:      meclizine (ANTIVERT) 25 MG tablet   ondansetron (ZOFRAN ODT) 4 MG ODT tab   IRON PO         Review of Systems   Constitutional: Negative for fever.   Respiratory: Negative for shortness of breath.    Cardiovascular: Negative for chest pain.   Gastrointestinal: Negative for abdominal pain.   Neurological:        See HPI   All other systems reviewed and are negative.      Physical Exam   BP: 130/85  Heart Rate: 66  Temp: 97.8  F (36.6  C)  Height: 154.9 cm (5' 1\")  Weight: 74.8 kg (165 lb)  SpO2: 100 %      Physical Exam  /60  Temp 97.8  F (36.6  C) (Oral)  Ht 1.549 m (5' 1\")  Wt 74.8 kg (165 lb)  SpO2 100%  BMI 31.18 kg/m2  General: alert, interactive, in no apparent distress  Head: atraumatic  Nose: no rhinorrhea or epistaxis  Ears: no external auditory canal discharge or bleeding.    Eyes: Sclera nonicteric. Conjunctiva noninjected. PERRL, EOMI  Mouth: no tonsillar erythema, edema, or exudate  Neck: supple, no palp LAD  Lungs: CTAB  CV: RRR, S1/S2; peripheral pulses palpable and symmetric  Abdomen: soft, nt, nd, no guarding or rebound. Positive bowel sounds  Extremities: no cyanosis or edema  Skin: no rash or diaphoresis  Neuro: CN III-XII grossly intact, strength 5/5 in UE and LEs bilaterally, sensation intact to light touch in UE and LEs bilaterally; finger to nose normal.        ED Course     ED Course     Procedures               Critical Care time:  none               Results for orders placed or performed during the hospital encounter of 11/24/18 (from the past 24 hour(s))   CBC with platelets differential   Result Value Ref Range    WBC 9.2 4.0 - 11.0 10e9/L    RBC Count 4.51 3.8 - 5.2 10e12/L    Hemoglobin 13.5 11.7 - 15.7 g/dL    Hematocrit 40.4 35.0 - 47.0 %    MCV 90 78 " - 100 fl    MCH 29.9 26.5 - 33.0 pg    MCHC 33.4 31.5 - 36.5 g/dL    RDW 12.3 10.0 - 15.0 %    Platelet Count 318 150 - 450 10e9/L    Diff Method Automated Method     % Neutrophils 53.0 %    % Lymphocytes 36.0 %    % Monocytes 9.0 %    % Eosinophils 1.3 %    % Basophils 0.5 %    % Immature Granulocytes 0.2 %    Nucleated RBCs 0 0 /100    Absolute Neutrophil 4.9 1.6 - 8.3 10e9/L    Absolute Lymphocytes 3.3 0.8 - 5.3 10e9/L    Absolute Monocytes 0.8 0.0 - 1.3 10e9/L    Absolute Eosinophils 0.1 0.0 - 0.7 10e9/L    Absolute Basophils 0.1 0.0 - 0.2 10e9/L    Abs Immature Granulocytes 0.0 0 - 0.4 10e9/L    Absolute Nucleated RBC 0.0    Comprehensive metabolic panel   Result Value Ref Range    Sodium 140 133 - 144 mmol/L    Potassium 3.9 3.4 - 5.3 mmol/L    Chloride 109 94 - 109 mmol/L    Carbon Dioxide 26 20 - 32 mmol/L    Anion Gap 5 3 - 14 mmol/L    Glucose 79 70 - 99 mg/dL    Urea Nitrogen 15 7 - 30 mg/dL    Creatinine 0.82 0.52 - 1.04 mg/dL    GFR Estimate 87 >60 mL/min/1.7m2    GFR Estimate If Black >90 >60 mL/min/1.7m2    Calcium 8.3 (L) 8.5 - 10.1 mg/dL    Bilirubin Total 0.3 0.2 - 1.3 mg/dL    Albumin 3.9 3.4 - 5.0 g/dL    Protein Total 7.9 6.8 - 8.8 g/dL    Alkaline Phosphatase 64 40 - 150 U/L    ALT 30 0 - 50 U/L    AST 19 0 - 45 U/L       Medications   0.9% sodium chloride BOLUS (0 mLs Intravenous Stopped 11/24/18 2157)   ketorolac (TORADOL) injection 15 mg (15 mg Intravenous Given 11/24/18 2051)   metoclopramide (REGLAN) injection 10 mg (10 mg Intravenous Given 11/24/18 2052)   meclizine (ANTIVERT) tablet 50 mg (50 mg Oral Given 11/24/18 2052)       Assessments & Plan (with Medical Decision Making)  23 year old female, healthy, 3 months postpartum, presenting to the emergency department with complaints of moderate severity generalized headache, in addition to dizziness, with room spinning sensation.  Arrives afebrile, normal vitals upon arrival.  Nonfocal neurologic exam.  No significant nystagmus on  examination.  Given patient's reported history of preeclampsia previously, with complaints of occasional lightheadedness, and dizziness upon standing, laboratory workup is performed including CBC, and CMP.  These tests are normal.  Patient was given Toradol, Reglan, and meclizine in the emergency department with 1 L IV normal saline.  She had much improved symptoms, with no symptoms at time of ambulation.  Patient will be sent home with prescription for meclizine, and Zofran.  Encourage follow-up with primary care provider, and return if severe worsening of symptoms.  Patient is comfortable with this plan.  No thunderclap headache that would be concerning for subarachnoid hemorrhage.  No infectious symptoms that would be concerning for meningismus.  No procedures, or trauma that would be concerning for other alternative cause of symptoms.     I have reviewed the nursing notes.    I have reviewed the findings, diagnosis, plan and need for follow up with the patient.       New Prescriptions    MECLIZINE (ANTIVERT) 25 MG TABLET    Take 1 tablet (25 mg) by mouth every 6 hours as needed for dizziness    ONDANSETRON (ZOFRAN ODT) 4 MG ODT TAB    Take 1 tablet (4 mg) by mouth every 8 hours as needed for nausea or vomiting       Final diagnoses:   Episodic tension-type headache, not intractable   Nausea   Benign paroxysmal positional vertigo, unspecified laterality       11/24/2018   [facility] EMERGENCY DEPARTMENT     Joes Briceño MD  11/24/18 3608

## 2018-11-25 NOTE — DISCHARGE INSTRUCTIONS
Meclizine as needed for dizziness.    zofran can be given as needed for nausea.          Benign Paroxysmal Positional Vertigo     Your health care provider may move your head in certain ways to treat your BPPV.     Benign paroxysmal positional vertigo (BPPV) is a problem with the inner ear. The inner ear contains the vestibular system. This system is what helps you keep your balance. BPPV causes a feeling of spinning. It is a common problem of the vestibular system.  Understanding the vestibular system  The vestibular system of the ear is made up of very tiny parts. They include the utricle, saccule, and semicircular canals. The utricle is a tiny organ that contains calcium crystals. In some people, the crystals can move into the semicircular canals. When this happens, the system no longer works as it should. This causes BPPV. Benign means it is not life threatening. Paroxysmal means it happens suddenly. Positional means that it happens when you move your head. Vertigo is a feeling of spinning.  What causes BPPV?  Causes include injury to your head or neck. Other problems with the vestibular system may cause BPPV. In many people, the cause of BPPV is not known.  Symptoms of BPPV  You many have repeated feelings of spinning (vertigo). The vertigo usually lasts less than 1 minute. Some movements, such as rolling over in bed, can bring on vertigo.  Diagnosing BPPV  Your primary healthcare provider may diagnose and treat your BPPV. Or you may see an ear, nose, and throat doctor (otolaryngologist). In some cases, you may see a nervous system doctor (neurologist).  The healthcare provider will ask about your symptoms and your medical history. He or she will examine you. You may have hearing and balance tests. As part of the exam, your healthcare provider may have you move your head and body in certain ways. If you have BPPV, the movements can bring on vertigo. Your provider will also look for abnormal movements of your  eyes. You may have other tests to check your vestibular or nervous systems.  Treatment for BPPV  Your healthcare provider may try to move the calcium crystals. This is done by having you move your head and neck in certain ways. This treatment is safe and often works well. You may also be told to do these movements at home. You may still have vertigo for a few weeks. Your healthcare provider will recheck your symptoms, usually in about a month. Special physical therapy may also be part of treatment. In rare cases, surgery may be needed for BPPV that does not go away.     When to call the healthcare provider  Call your healthcare provider right away if you have any of these:    Symptoms that do not go away with treatment    Symptoms that get worse    New symptoms   Date Last Reviewed: 5/1/2017 2000-2018 [employer]. [address] [place] [address], [place] [address]. All rights reserved. This information is not intended as a substitute for professional medical care. Always follow your healthcare professional's instructions.

## 2019-03-01 ENCOUNTER — HOSPITAL ENCOUNTER (EMERGENCY)
Facility: CLINIC | Age: 24
Discharge: HOME | End: 2019-03-01
Attending: EMERGENCY MEDICINE | Admitting: EMERGENCY MEDICINE
Payer: COMMERCIAL

## 2019-03-01 ENCOUNTER — APPOINTMENT (OUTPATIENT)
Dept: ULTRASOUND IMAGING | Facility: CLINIC | Age: 24
End: 2019-03-01
Attending: EMERGENCY MEDICINE
Payer: COMMERCIAL

## 2019-03-01 VITALS
RESPIRATION RATE: 16 BRPM | HEART RATE: 89 BPM | SYSTOLIC BLOOD PRESSURE: 120 MMHG | WEIGHT: 170 LBS | BODY MASS INDEX: 32.12 KG/M2 | DIASTOLIC BLOOD PRESSURE: 74 MMHG | TEMPERATURE: 98.4 F | OXYGEN SATURATION: 90 %

## 2019-03-01 DIAGNOSIS — Z3A.10 10 WEEKS GESTATION OF PREGNANCY: ICD-10-CM

## 2019-03-01 LAB
ABO + RH BLD: NORMAL
ABO + RH BLD: NORMAL
ALBUMIN SERPL-MCNC: 3.7 G/DL (ref 3.4–5)
ALP SERPL-CCNC: 61 U/L (ref 40–150)
ALT SERPL W P-5'-P-CCNC: 20 U/L (ref 0–50)
ANION GAP SERPL CALCULATED.3IONS-SCNC: 6 MMOL/L (ref 3–14)
AST SERPL W P-5'-P-CCNC: 8 U/L (ref 0–45)
B-HCG SERPL-ACNC: ABNORMAL IU/L (ref 0–5)
BASOPHILS # BLD AUTO: 0 10E9/L (ref 0–0.2)
BASOPHILS NFR BLD AUTO: 0.3 %
BILIRUB SERPL-MCNC: 0.2 MG/DL (ref 0.2–1.3)
BUN SERPL-MCNC: 5 MG/DL (ref 7–30)
CALCIUM SERPL-MCNC: 9.3 MG/DL (ref 8.5–10.1)
CHLORIDE SERPL-SCNC: 104 MMOL/L (ref 94–109)
CO2 SERPL-SCNC: 26 MMOL/L (ref 20–32)
CREAT SERPL-MCNC: 0.59 MG/DL (ref 0.52–1.04)
DIFFERENTIAL METHOD BLD: ABNORMAL
EOSINOPHIL # BLD AUTO: 0.1 10E9/L (ref 0–0.7)
EOSINOPHIL NFR BLD AUTO: 0.9 %
ERYTHROCYTE [DISTWIDTH] IN BLOOD BY AUTOMATED COUNT: 13 % (ref 10–15)
GFR SERPL CREATININE-BSD FRML MDRD: >90 ML/MIN/{1.73_M2}
GLUCOSE SERPL-MCNC: 80 MG/DL (ref 70–99)
HCT VFR BLD AUTO: 37.6 % (ref 35–47)
HGB BLD-MCNC: 13.9 G/DL (ref 11.7–15.7)
IMM GRANULOCYTES # BLD: 0 10E9/L (ref 0–0.4)
IMM GRANULOCYTES NFR BLD: 0.3 %
LYMPHOCYTES # BLD AUTO: 2.8 10E9/L (ref 0.8–5.3)
LYMPHOCYTES NFR BLD AUTO: 24 %
MCH RBC QN AUTO: 31.2 PG (ref 26.5–33)
MCHC RBC AUTO-ENTMCNC: 37 G/DL (ref 31.5–36.5)
MCV RBC AUTO: 84 FL (ref 78–100)
MONOCYTES # BLD AUTO: 1 10E9/L (ref 0–1.3)
MONOCYTES NFR BLD AUTO: 8.5 %
NEUTROPHILS # BLD AUTO: 7.7 10E9/L (ref 1.6–8.3)
NEUTROPHILS NFR BLD AUTO: 66 %
NRBC # BLD AUTO: 0 10*3/UL
NRBC BLD AUTO-RTO: 0 /100
PLATELET # BLD AUTO: 319 10E9/L (ref 150–450)
POTASSIUM SERPL-SCNC: 4.1 MMOL/L (ref 3.4–5.3)
PROT SERPL-MCNC: 7.6 G/DL (ref 6.8–8.8)
RBC # BLD AUTO: 4.46 10E12/L (ref 3.8–5.2)
SODIUM SERPL-SCNC: 136 MMOL/L (ref 133–144)
SPECIMEN EXP DATE BLD: NORMAL
WBC # BLD AUTO: 11.6 10E9/L (ref 4–11)

## 2019-03-01 PROCEDURE — 76801 OB US < 14 WKS SINGLE FETUS: CPT

## 2019-03-01 PROCEDURE — 86900 BLOOD TYPING SEROLOGIC ABO: CPT | Performed by: EMERGENCY MEDICINE

## 2019-03-01 PROCEDURE — 96361 HYDRATE IV INFUSION ADD-ON: CPT | Performed by: EMERGENCY MEDICINE

## 2019-03-01 PROCEDURE — 86901 BLOOD TYPING SEROLOGIC RH(D): CPT | Performed by: EMERGENCY MEDICINE

## 2019-03-01 PROCEDURE — 96374 THER/PROPH/DIAG INJ IV PUSH: CPT | Performed by: EMERGENCY MEDICINE

## 2019-03-01 PROCEDURE — 99284 EMERGENCY DEPT VISIT MOD MDM: CPT | Mod: Z6 | Performed by: EMERGENCY MEDICINE

## 2019-03-01 PROCEDURE — 80053 COMPREHEN METABOLIC PANEL: CPT | Performed by: EMERGENCY MEDICINE

## 2019-03-01 PROCEDURE — 25000128 H RX IP 250 OP 636: Performed by: EMERGENCY MEDICINE

## 2019-03-01 PROCEDURE — 84702 CHORIONIC GONADOTROPIN TEST: CPT | Performed by: EMERGENCY MEDICINE

## 2019-03-01 PROCEDURE — 85025 COMPLETE CBC W/AUTO DIFF WBC: CPT | Performed by: EMERGENCY MEDICINE

## 2019-03-01 PROCEDURE — 99285 EMERGENCY DEPT VISIT HI MDM: CPT | Mod: 25 | Performed by: EMERGENCY MEDICINE

## 2019-03-01 RX ORDER — BUSPIRONE HYDROCHLORIDE 10 MG/1
10 TABLET ORAL 2 TIMES DAILY
COMMUNITY
Start: 2019-02-22

## 2019-03-01 RX ORDER — SERTRALINE HYDROCHLORIDE 100 MG/1
150 TABLET, FILM COATED ORAL EVERY MORNING
COMMUNITY
Start: 2019-02-22

## 2019-03-01 RX ORDER — ACETAMINOPHEN 325 MG/1
4 TABLET ORAL EVERY 6 HOURS PRN
COMMUNITY

## 2019-03-01 RX ORDER — ONDANSETRON 4 MG/1
4 TABLET, ORALLY DISINTEGRATING ORAL EVERY 8 HOURS PRN
Qty: 10 TABLET | Refills: 0 | Status: SHIPPED | OUTPATIENT
Start: 2019-03-01 | End: 2019-03-04

## 2019-03-01 RX ORDER — ALBUTEROL SULFATE 90 UG/1
2 AEROSOL, METERED RESPIRATORY (INHALATION) EVERY 4 HOURS PRN
COMMUNITY
Start: 2018-01-15

## 2019-03-01 RX ORDER — ONDANSETRON 2 MG/ML
4 INJECTION INTRAMUSCULAR; INTRAVENOUS ONCE
Status: COMPLETED | OUTPATIENT
Start: 2019-03-01 | End: 2019-03-01

## 2019-03-01 RX ADMIN — SODIUM CHLORIDE 1000 ML: 9 INJECTION, SOLUTION INTRAVENOUS at 16:01

## 2019-03-01 RX ADMIN — ONDANSETRON 4 MG: 2 INJECTION INTRAMUSCULAR; INTRAVENOUS at 16:01

## 2019-03-01 NOTE — DISCHARGE INSTRUCTIONS
Tylenol/ibuprofen for discomfort    Follow-up OB as scheduled    Return here for severe pain, profuse bleeding or any other concern.

## 2019-03-01 NOTE — ED PROVIDER NOTES
History     Chief Complaint   Patient presents with     Vaginal Bleeding     low abd cramping, vag bleeding yesterday, 10 wek preg.      HPI  Meeta Palacios is a 23 year old female who presents with vaginal spotting and pelvic cramps that began yesterday evening.  10 weeks  by LMP date.  Denies fever, urinary symptoms or changes in bowel movement.  Nauseated minimal vomiting.    Allergies:  Allergies   Allergen Reactions     Latex Rash       Problem List:    Patient Active Problem List    Diagnosis Date Noted     Prenatal care of multigravida, antepartum 2018     Priority: Medium     Overview:   SPK - FOB  Involved  Hx of SAB in 2017    GCT:  HGB:  GBS:  TDAP:   RHOGAM:  US:       Vertigo 2010     Priority: Medium     ADHD 2006     Priority: Medium        Past Medical History:    Past Medical History:   Diagnosis Date     Attention-deficit hyperactivity disorder        Past Surgical History:    Past Surgical History:   Procedure Laterality Date     FINGER SURGERY      ,right pinky, fracture at growth plate     OTHER SURGICAL HISTORY      [id number],OTHER,root canal, and crown.       Family History:    Family History   Problem Relation Age of Onset     Other - See Comments Mother         headaches with visual changes, dizziness, nausea, and fatigue     Hypertension Maternal Grandfather         Hypertension     Other - See Comments Maternal Grandfather         high cholesterol       Social History:  Marital Status:  Single [1]  Social History     Tobacco Use     Smoking status: Current Some Day Smoker     Packs/day: 0.25     Types: Cigarettes     Smokeless tobacco: Never Used   Substance Use Topics     Alcohol use: Yes     Alcohol/week: 3.6 oz     Comment: Alcoholic Drinks/day: monthly     Drug use: Unknown     Types: Other     Comment: Drug use: No        Medications:      acetaminophen (TYLENOL) 325 MG tablet   albuterol (VENTOLIN HFA) 108 (90 Base) MCG/ACT inhaler   busPIRone  (BUSPAR) 10 MG tablet   IRON PO   IRON PO   ondansetron (ZOFRAN ODT) 4 MG ODT tab   sertraline (ZOLOFT) 100 MG tablet         Review of Systems  All other systems reviewed and are negative.    Physical Exam   BP: 120/74  Pulse: 89  Temp: 98.4  F (36.9  C)  Resp: 16  Weight: 77.1 kg (170 lb)  SpO2: 98 %      Physical Exam  Nontoxic-appearing no respiratory distress alert and oriented  Head atraumatic normocephalic  Lungs clear  Heart regular no murmur  Abdomen soft nontender bowel sounds positive  ED Course        Procedures               Critical Care time:  none                    Results for orders placed or performed during the hospital encounter of 03/01/19 (from the past 24 hour(s))   CBC with platelets differential   Result Value Ref Range    WBC 11.6 (H) 4.0 - 11.0 10e9/L    RBC Count 4.46 3.8 - 5.2 10e12/L    Hemoglobin 13.9 11.7 - 15.7 g/dL    Hematocrit 37.6 35.0 - 47.0 %    MCV 84 78 - 100 fl    MCH 31.2 26.5 - 33.0 pg    MCHC 37.0 (H) 31.5 - 36.5 g/dL    RDW 13.0 10.0 - 15.0 %    Platelet Count 319 150 - 450 10e9/L    Diff Method Automated Method     % Neutrophils 66.0 %    % Lymphocytes 24.0 %    % Monocytes 8.5 %    % Eosinophils 0.9 %    % Basophils 0.3 %    % Immature Granulocytes 0.3 %    Nucleated RBCs 0 0 /100    Absolute Neutrophil 7.7 1.6 - 8.3 10e9/L    Absolute Lymphocytes 2.8 0.8 - 5.3 10e9/L    Absolute Monocytes 1.0 0.0 - 1.3 10e9/L    Absolute Eosinophils 0.1 0.0 - 0.7 10e9/L    Absolute Basophils 0.0 0.0 - 0.2 10e9/L    Abs Immature Granulocytes 0.0 0 - 0.4 10e9/L    Absolute Nucleated RBC 0.0    Comprehensive metabolic panel   Result Value Ref Range    Sodium 136 133 - 144 mmol/L    Potassium 4.1 3.4 - 5.3 mmol/L    Chloride 104 94 - 109 mmol/L    Carbon Dioxide 26 20 - 32 mmol/L    Anion Gap 6 3 - 14 mmol/L    Glucose 80 70 - 99 mg/dL    Urea Nitrogen 5 (L) 7 - 30 mg/dL    Creatinine 0.59 0.52 - 1.04 mg/dL    GFR Estimate >90 >60 mL/min/[1.73_m2]    GFR Estimate If Black >90 >60  mL/min/[1.73_m2]    Calcium 9.3 8.5 - 10.1 mg/dL    Bilirubin Total 0.2 0.2 - 1.3 mg/dL    Albumin 3.7 3.4 - 5.0 g/dL    Protein Total 7.6 6.8 - 8.8 g/dL    Alkaline Phosphatase 61 40 - 150 U/L    ALT 20 0 - 50 U/L    AST 8 0 - 45 U/L   HCG quantitative pregnancy (blood)   Result Value Ref Range    HCG Quantitative Serum 124,984 (H) 0 - 5 IU/L   ABO and Rh   Result Value Ref Range    ABO A     RH(D) Pos     Specimen Expires 2019    OB < 14 weeks single or first gestation US    Narrative    US OB < 14 WEEKS SINGLE  3/1/2019 5:00 PM    HISTORY: Vaginal bleeding and cramps at 10 weeks.    TECHNIQUE: Transabdominal exam only.    COMPARISON:  None.    FINDINGS:      Estimated gestational age by current ultrasound measurement: 10 weeks  and 2 days.  Estimated date of delivery based on this ultrasound: 2019.  Crown-rump length: 3.3 cm.   Embryonic cardiac activity: 170 bpm.   Yolk sac: Present.  Subchorionic hemorrhage: Single small subchorionic hemorrhage  measuring 2.0 x 0.7 x 2.0 cm on the right.    Right ovary: Unremarkable.  Left ovary: Corpus luteum cyst. Otherwise unremarkable.  Adnexal mass: None.  Free pelvic fluid: None.      Impression    IMPRESSION:   1. Single intrauterine gestation with cardiac activity at 10 weeks and  2 days.  2. Small right-sided subchorionic hemorrhage.         Medications   ondansetron (ZOFRAN) injection 4 mg (4 mg Intravenous Given 3/1/19 1601)   0.9% sodium chloride BOLUS (1,000 mLs Intravenous New Bag 3/1/19 1601)       Assessments & Plan (with Medical Decision Making)  23-year-old  female weeks 2 days presents with suprapubic cramps and vaginal spotting.  Ultrasound significant for subchorionic hemorrhage as detailed above.  Viable 10-week 2-day gestation.  Labs normal, Rh+.  No indication for further evaluation.  Discussed natural history subchorionic hemorrhage, follow-up with OB/GYN next week as scheduled.  Return criteria reviewed.     I have reviewed the nursing  notes.    I have reviewed the findings, diagnosis, plan and need for follow up with the patient.          Medication List      Started    ondansetron 4 MG ODT tab  Commonly known as:  ZOFRAN ODT  4 mg, Oral, EVERY 8 HOURS PRN            Final diagnoses:   10 weeks gestation of pregnancy   Subchorionic hemorrhage of placenta in first trimester, single or unspecified fetus       3/1/2019   [facility] EMERGENCY DEPARTMENT     Edgardo Doran MD  03/01/19 1735

## 2019-03-01 NOTE — ED AVS SNAPSHOT
[facility] Emergency Department  [address] [address]  Phone:  [phone]  Fax:  [phone]                                    Meeta Palacios   MRN: [id number]    Department:  [facility] Emergency Department   Date of Visit:  3/1/2019           After Visit Summary Signature Page    I have received my discharge instructions, and my questions have been answered. I have discussed any challenges I see with this plan with the nurse or doctor.    ..........................................................................................................................................  Patient/Patient Representative Signature      ..........................................................................................................................................  Patient Representative Print Name and Relationship to Patient    ..................................................               ................................................  Date                                   Time    ..........................................................................................................................................  Reviewed by Signature/Title    ...................................................              ..............................................  Date                                               Time          22EPIC Rev 08/18

## 2019-03-06 ENCOUNTER — NURSE TRIAGE (OUTPATIENT)
Dept: NURSING | Facility: CLINIC | Age: 24
End: 2019-03-06

## 2019-03-06 ENCOUNTER — APPOINTMENT (OUTPATIENT)
Dept: ULTRASOUND IMAGING | Facility: CLINIC | Age: 24
End: 2019-03-06
Attending: EMERGENCY MEDICINE
Payer: COMMERCIAL

## 2019-03-06 ENCOUNTER — HOSPITAL ENCOUNTER (EMERGENCY)
Facility: CLINIC | Age: 24
Discharge: HOME | End: 2019-03-06
Attending: EMERGENCY MEDICINE | Admitting: EMERGENCY MEDICINE
Payer: COMMERCIAL

## 2019-03-06 VITALS
SYSTOLIC BLOOD PRESSURE: 104 MMHG | DIASTOLIC BLOOD PRESSURE: 58 MMHG | TEMPERATURE: 98.3 F | WEIGHT: 173 LBS | HEART RATE: 81 BPM | BODY MASS INDEX: 32.66 KG/M2 | OXYGEN SATURATION: 96 % | HEIGHT: 61 IN | RESPIRATION RATE: 18 BRPM

## 2019-03-06 DIAGNOSIS — Z33.1 PREGNANCY, INCIDENTAL: ICD-10-CM

## 2019-03-06 DIAGNOSIS — O23.41 UTI (URINARY TRACT INFECTION) IN PREGNANCY IN FIRST TRIMESTER: ICD-10-CM

## 2019-03-06 DIAGNOSIS — R10.31 BILATERAL LOWER ABDOMINAL CRAMPING: ICD-10-CM

## 2019-03-06 DIAGNOSIS — R10.32 BILATERAL LOWER ABDOMINAL CRAMPING: ICD-10-CM

## 2019-03-06 DIAGNOSIS — K59.00 CONSTIPATION, UNSPECIFIED CONSTIPATION TYPE: ICD-10-CM

## 2019-03-06 LAB
ALBUMIN UR-MCNC: NEGATIVE MG/DL
ANION GAP SERPL CALCULATED.3IONS-SCNC: 6 MMOL/L (ref 3–14)
APPEARANCE UR: ABNORMAL
BACTERIA #/AREA URNS HPF: ABNORMAL /HPF
BASOPHILS # BLD AUTO: 0 10E9/L (ref 0–0.2)
BASOPHILS NFR BLD AUTO: 0.3 %
BILIRUB UR QL STRIP: NEGATIVE
BUN SERPL-MCNC: 6 MG/DL (ref 7–30)
CALCIUM SERPL-MCNC: 9.1 MG/DL (ref 8.5–10.1)
CHLORIDE SERPL-SCNC: 104 MMOL/L (ref 94–109)
CO2 SERPL-SCNC: 26 MMOL/L (ref 20–32)
COLOR UR AUTO: YELLOW
CREAT SERPL-MCNC: 0.55 MG/DL (ref 0.52–1.04)
DIFFERENTIAL METHOD BLD: ABNORMAL
EOSINOPHIL # BLD AUTO: 0 10E9/L (ref 0–0.7)
EOSINOPHIL NFR BLD AUTO: 0.3 %
ERYTHROCYTE [DISTWIDTH] IN BLOOD BY AUTOMATED COUNT: 12.9 % (ref 10–15)
GFR SERPL CREATININE-BSD FRML MDRD: >90 ML/MIN/{1.73_M2}
GLUCOSE SERPL-MCNC: 88 MG/DL (ref 70–99)
GLUCOSE UR STRIP-MCNC: NEGATIVE MG/DL
HCT VFR BLD AUTO: 41.8 % (ref 35–47)
HGB BLD-MCNC: 14.5 G/DL (ref 11.7–15.7)
HGB UR QL STRIP: NEGATIVE
IMM GRANULOCYTES # BLD: 0 10E9/L (ref 0–0.4)
IMM GRANULOCYTES NFR BLD: 0.3 %
KETONES UR STRIP-MCNC: NEGATIVE MG/DL
LEUKOCYTE ESTERASE UR QL STRIP: ABNORMAL
LYMPHOCYTES # BLD AUTO: 1.9 10E9/L (ref 0.8–5.3)
LYMPHOCYTES NFR BLD AUTO: 16.1 %
MCH RBC QN AUTO: 30.3 PG (ref 26.5–33)
MCHC RBC AUTO-ENTMCNC: 34.7 G/DL (ref 31.5–36.5)
MCV RBC AUTO: 87 FL (ref 78–100)
MONOCYTES # BLD AUTO: 1 10E9/L (ref 0–1.3)
MONOCYTES NFR BLD AUTO: 8.4 %
NEUTROPHILS # BLD AUTO: 8.6 10E9/L (ref 1.6–8.3)
NEUTROPHILS NFR BLD AUTO: 74.6 %
NITRATE UR QL: NEGATIVE
NRBC # BLD AUTO: 0 10*3/UL
NRBC BLD AUTO-RTO: 0 /100
PH UR STRIP: 5 PH (ref 5–7)
PLATELET # BLD AUTO: 332 10E9/L (ref 150–450)
POTASSIUM SERPL-SCNC: 3.8 MMOL/L (ref 3.4–5.3)
RBC # BLD AUTO: 4.78 10E12/L (ref 3.8–5.2)
RBC #/AREA URNS AUTO: 3 /HPF (ref 0–2)
SODIUM SERPL-SCNC: 136 MMOL/L (ref 133–144)
SOURCE: ABNORMAL
SP GR UR STRIP: 1.01 (ref 1–1.03)
SQUAMOUS #/AREA URNS AUTO: 4 /HPF (ref 0–1)
UROBILINOGEN UR STRIP-MCNC: 0 MG/DL (ref 0–2)
WBC # BLD AUTO: 11.6 10E9/L (ref 4–11)
WBC #/AREA URNS AUTO: 8 /HPF (ref 0–5)

## 2019-03-06 PROCEDURE — 81001 URINALYSIS AUTO W/SCOPE: CPT | Performed by: EMERGENCY MEDICINE

## 2019-03-06 PROCEDURE — 96361 HYDRATE IV INFUSION ADD-ON: CPT | Performed by: EMERGENCY MEDICINE

## 2019-03-06 PROCEDURE — 96374 THER/PROPH/DIAG INJ IV PUSH: CPT | Performed by: EMERGENCY MEDICINE

## 2019-03-06 PROCEDURE — 76801 OB US < 14 WKS SINGLE FETUS: CPT

## 2019-03-06 PROCEDURE — 99285 EMERGENCY DEPT VISIT HI MDM: CPT | Mod: 25 | Performed by: EMERGENCY MEDICINE

## 2019-03-06 PROCEDURE — 80048 BASIC METABOLIC PNL TOTAL CA: CPT | Performed by: EMERGENCY MEDICINE

## 2019-03-06 PROCEDURE — 85025 COMPLETE CBC W/AUTO DIFF WBC: CPT | Performed by: EMERGENCY MEDICINE

## 2019-03-06 PROCEDURE — 99284 EMERGENCY DEPT VISIT MOD MDM: CPT | Mod: Z6 | Performed by: EMERGENCY MEDICINE

## 2019-03-06 PROCEDURE — 25000128 H RX IP 250 OP 636: Performed by: EMERGENCY MEDICINE

## 2019-03-06 PROCEDURE — 87086 URINE CULTURE/COLONY COUNT: CPT | Performed by: EMERGENCY MEDICINE

## 2019-03-06 RX ORDER — ONDANSETRON 2 MG/ML
4 INJECTION INTRAMUSCULAR; INTRAVENOUS ONCE
Status: COMPLETED | OUTPATIENT
Start: 2019-03-06 | End: 2019-03-06

## 2019-03-06 RX ADMIN — SODIUM CHLORIDE 500 ML: 9 INJECTION, SOLUTION INTRAVENOUS at 14:54

## 2019-03-06 RX ADMIN — ONDANSETRON 4 MG: 2 INJECTION INTRAMUSCULAR; INTRAVENOUS at 14:54

## 2019-03-06 ASSESSMENT — MIFFLIN-ST. JEOR: SCORE: 1487.1

## 2019-03-06 NOTE — TELEPHONE ENCOUNTER
24 y/o female calls about 11 weeks pregnant and constipated, last BM was 5-6 days ago, drank hot tea and lots of water, x-lax 1 tablet at 9:30AM ( 2 last night) and, she describes nausea and vomiting, threw up this morning - more related to morning sickness but reports constant sever abdominal pain.  Initially RN was advising Home Care interventions for constipation but when patient reports severe abdominal pain for >2 hours, RN advised per protocols to go be seen in the next 4 hours, she will go to [facility], opens at 12noon.     Silvia Coughlin RN - [facility] Nurse Advisor  063/06/2019  11:22AM    Reason for Disposition    [1] Constant abdominal pain AND [2] present > 2 hours    Additional Information    Negative: [1] Abdominal pain is main symptom AND [2] adult male    Negative: [1] Abdominal pain is main symptom AND [2] adult female    Negative: Rectal bleeding or blood in stool is main symptom    Negative: Rectal pain or itching is main symptom    Negative: [1] Vomiting AND [2] abdomen looks much more swollen than usual    Negative: [1] Vomiting AND [2] contains bile (green color)    Protocols used: CONSTIPATION-ADULT-

## 2019-03-06 NOTE — ED PROVIDER NOTES
History     Chief Complaint   Patient presents with     Constipation     no BM 7 days has used OTC yesterday with no results,      Abdominal Cramping     HPI  Meeta Palacios is a 23 year old female who is 11 weeks gestation and presents for evaluation of constipation and severe lower abdominal pain beginning yesterday afternoon.  She has noted no bowel movement for 1 week.  She has used Exlax with no relief and continued to push fluids and a high fiber diet.  She was seen by her OB yesterday, she did not have pain at that time, and was advised to use Miralax.  Today, she reports 3 episodes of emesis.  She denies fevers.  She currently rates her pain a 3 out of 10.  She denies headache, visual changes, neck pain, back pain, chest pain, shortness of breath.  She has not had any urinary symptoms.  She denies any vaginal bleeding.  She denies any urinary symptoms or focal numbness weakness any extremity.    Allergies:  Allergies   Allergen Reactions     Latex Rash       Problem List:    Patient Active Problem List    Diagnosis Date Noted     Prenatal care of multigravida, antepartum 01/05/2018     Priority: Medium     Overview:   SPK - FOB  Involved  Hx of SAB in 1/2017    GCT:  HGB:  GBS:  TDAP:   RHOGAM:  US:       Vertigo 08/31/2010     Priority: Medium     ADHD 03/14/2006     Priority: Medium        Past Medical History:    Past Medical History:   Diagnosis Date     Attention-deficit hyperactivity disorder        Past Surgical History:    Past Surgical History:   Procedure Laterality Date     FINGER SURGERY      2010,right pinky, fracture at growth plate     OTHER SURGICAL HISTORY      714497,OTHER,root canal, and crown.       Family History:    Family History   Problem Relation Age of Onset     Other - See Comments Mother         headaches with visual changes, dizziness, nausea, and fatigue     Hypertension Maternal Grandfather         Hypertension     Other - See Comments Maternal Grandfather         high  "cholesterol       Social History:  Marital Status:  Single [1]  Social History     Tobacco Use     Smoking status: Current Some Day Smoker     Packs/day: 0.25     Types: Cigarettes     Smokeless tobacco: Never Used   Substance Use Topics     Alcohol use: Yes     Alcohol/week: 3.6 oz     Comment: Alcoholic Drinks/day: monthly     Drug use: Unknown     Types: Other     Comment: Drug use: No        Medications:      acetaminophen (TYLENOL) 325 MG tablet   albuterol (VENTOLIN HFA) 108 (90 Base) MCG/ACT inhaler   busPIRone (BUSPAR) 10 MG tablet   IRON PO   IRON PO   sertraline (ZOLOFT) 100 MG tablet         Review of Systems     All systems reviewed and other than pertinent positives and negatives in HPI all other systems are negative.  Physical Exam   BP: 122/61  Heart Rate: 108  Temp: 98.3  F (36.8  C)  Height: 154.9 cm (5' 1\")  Weight: 78.5 kg (173 lb)  SpO2: 98 %      Physical Exam   HENT:   Head: Normocephalic.   Mouth/Throat: Oropharynx is clear and moist.   Posterior pharynx without erythema edema.   Genitourinary:   Genitourinary Comments: No rectal lesions.  Normal tone.  No palpable stool in rectal vault.  Heme-negative stool   Psychiatric: She has a normal mood and affect.   Nursing note and vitals reviewed.      Constitutional: She is oriented to person, place, and time. Appears well-developed and well-nourished.  Eyes: Conjunctiva normal.  Neck: Normal range of motion. Neck supple. No JVD present.   Cardiovascular: Normal rate, regular rhythm, normal heart sounds and intact distal pulses. No murmur heard.  Pulmonary/Chest: Effort normal and breath sounds normal. No stridor. No respiratory distress. No wheezes.   Abdominal: Soft. Bowel sounds are positive.  . tenderness to palpation  to lower abdomen.Exhibits no distension and no mass. There is no rebound and no guarding  Musculoskeletal: Normal range of motion.  No midline back tenderness or flank pain is noted.  Neurological: She is alert.  No focal " neurologic deficit.  Skin: Skin is warm and dry.. No rash noted.     Nursing note and vitals reviewed.       ED Course        Procedures               Critical Care time:  none               Results for orders placed or performed during the hospital encounter of 03/06/19 (from the past 24 hour(s))   OB < 14 weeks single or first gestation US    Narrative    ULTRASOUND OBSTETRIC FIRST TRIMESTER WITH TRANSVAGINAL     PROCEDURE DATE:  3/6/2019 2:28 PM    HISTORY:   lower abdominal cramping  11 weeks pregnant, as above    TECHNIQUE:   Transabdominal and transvaginal imaging were performed.      COMPARISON:    3/1/2019.    FINDINGS:      Estimated gestational age by current ultrasound measurement: 10 weeks  6 days.  Estimated date of delivery based on this ultrasound: 9/26/2019.  Crown-rump length: 3.9 cm.   Embryonic cardiac activity: 165 bpm.   Yolk sac: Present.  Subchorionic hemorrhage: Small subchorionic hemorrhage measuring 1.4 x  1.1 x 0.6 cm.    Right ovary: 3.2 x 2.1 x 2.3 cm and unremarkable..  Left ovary: 3.3 x 2.5 x 3.1 cm unremarkable..  Adnexal mass: None.  Free pelvic fluid: None.      Impression    IMPRESSION:   1.  Single living intrauterine pregnancy  2.  Small right-sided subchorionic hemorrhage.  3.  Estimated delivery by original dating ultrasound 9/25/2019.    DAYO BELTRE MD   CBC with platelets differential   Result Value Ref Range    WBC 11.6 (H) 4.0 - 11.0 10e9/L    RBC Count 4.78 3.8 - 5.2 10e12/L    Hemoglobin 14.5 11.7 - 15.7 g/dL    Hematocrit 41.8 35.0 - 47.0 %    MCV 87 78 - 100 fl    MCH 30.3 26.5 - 33.0 pg    MCHC 34.7 31.5 - 36.5 g/dL    RDW 12.9 10.0 - 15.0 %    Platelet Count 332 150 - 450 10e9/L    Diff Method Automated Method     % Neutrophils 74.6 %    % Lymphocytes 16.1 %    % Monocytes 8.4 %    % Eosinophils 0.3 %    % Basophils 0.3 %    % Immature Granulocytes 0.3 %    Nucleated RBCs 0 0 /100    Absolute Neutrophil 8.6 (H) 1.6 - 8.3 10e9/L    Absolute Lymphocytes 1.9 0.8 -  5.3 10e9/L    Absolute Monocytes 1.0 0.0 - 1.3 10e9/L    Absolute Eosinophils 0.0 0.0 - 0.7 10e9/L    Absolute Basophils 0.0 0.0 - 0.2 10e9/L    Abs Immature Granulocytes 0.0 0 - 0.4 10e9/L    Absolute Nucleated RBC 0.0    Basic metabolic panel   Result Value Ref Range    Sodium 136 133 - 144 mmol/L    Potassium 3.8 3.4 - 5.3 mmol/L    Chloride 104 94 - 109 mmol/L    Carbon Dioxide 26 20 - 32 mmol/L    Anion Gap 6 3 - 14 mmol/L    Glucose 88 70 - 99 mg/dL    Urea Nitrogen 6 (L) 7 - 30 mg/dL    Creatinine 0.55 0.52 - 1.04 mg/dL    GFR Estimate >90 >60 mL/min/[1.73_m2]    GFR Estimate If Black >90 >60 mL/min/[1.73_m2]    Calcium 9.1 8.5 - 10.1 mg/dL   UA with Microscopic   Result Value Ref Range    Color Urine Yellow     Appearance Urine Slightly Cloudy     Glucose Urine Negative NEG^Negative mg/dL    Bilirubin Urine Negative NEG^Negative    Ketones Urine Negative NEG^Negative mg/dL    Specific Gravity Urine 1.008 1.003 - 1.035    Blood Urine Negative NEG^Negative    pH Urine 5.0 5.0 - 7.0 pH    Protein Albumin Urine Negative NEG^Negative mg/dL    Urobilinogen mg/dL 0.0 0.0 - 2.0 mg/dL    Nitrite Urine Negative NEG^Negative    Leukocyte Esterase Urine Large (A) NEG^Negative    Source Midstream Urine     WBC Urine 8 (H) 0 - 5 /HPF    RBC Urine 3 (H) 0 - 2 /HPF    Bacteria Urine Few (A) NEG^Negative /HPF    Squamous Epithelial /HPF Urine 4 (H) 0 - 1 /HPF   Urine Culture Aerobic Bacterial   Result Value Ref Range    Specimen Description Midstream Urine     Special Requests Specimen received in preservative     Culture Micro PENDING        Medications   ondansetron (ZOFRAN) injection 4 mg (4 mg Intravenous Given 3/6/19 6924)   0.9% sodium chloride BOLUS (0 mLs Intravenous Stopped 3/6/19 1627)     1335 Patient Assessed  Assessments & Plan (with Medical Decision Making) records were reviewed.  Labs were obtained patient was given IV fluids and Zofran.  A ultrasound was obtained.  Patient's white count was slightly elevated  with a mild left shift.  Basic metabolic panel without significant abnormality.  Urine analysis with large leukocyte esterase 8 WBCs and 3 RBCs.  There were four squamous cells present.  Urine culture was sent.pelvic ultrasound was obtained.  Pelvic ultrasound with intrauterine pregnancy 10 weeks 6-day with small subchorionic hemorrhage noted.  There is no pelvic free fluid or adnexal abnormality.  Patient was feeling better after medications.  I discussed findings with patient.  I am going to treat her with Augmentin for a UTI.  We will adjust according to culture results.  There is no stool in the rectal vault so I do not think a enema is warranted at this time.  I did discuss possible further imaging studies but due to pregnancy we wanted to hold off on these at this time.  She is going to try MiraLAX or magnesium citrate over the next few days and if symptoms worsen or new symptoms develop she has no bowel movement or other problems occur she will immediately return for further evaluation and care.  She will discuss this with her primary care tomorrow.  Patient and significant other are in agreement this plan.     I have reviewed the nursing notes.    I have reviewed the findings, diagnosis, plan and need for follow up with the patient.          Medication List      Started    amoxicillin-clavulanate 875-125 MG tablet  Commonly known as:  AUGMENTIN  1 tablet, Oral, 2 TIMES DAILY        ASK your doctor about these medications    ondansetron 4 MG ODT tab  Commonly known as:  ZOFRAN ODT  4 mg, Oral, EVERY 8 HOURS PRN  Ask about: Should I take this medication?            Final diagnoses:   Pregnancy, incidental   Constipation, unspecified constipation type - probable   Bilateral lower abdominal cramping   UTI (urinary tract infection) in pregnancy in first trimester     This document serves as a record of the services and decisions personally performed and made by Jose Flores MD. It was created on HIS/HER  behalf by Asha Osman, a trained medical scribe. The creation of this document is based the provider's statements to the medical scribe.  Asha Osman 1:44 PM 3/6/2019    Provider:   The information in this document, created by the medical scribe for me, accurately reflects the services I personally performed and the decisions made by me. I have reviewed and approved this document for accuracy prior to leaving the patient care area.  Jose Flores MD 1:44 PM 3/6/2019     3/6/2019   [facility] EMERGENCY DEPARTMENT     Jose Flores MD  03/07/19 1030

## 2019-03-06 NOTE — DISCHARGE INSTRUCTIONS
Return if symptoms worsen or new symptoms develop.  Follow-up with primary care physician later this week for recheck.  Take antibiotics as directed.  Take mag citrate or MiraLAX as directed for relief of constipation.  If no results can try in a home enema of these can be bought over-the-counter.  If worsening abdominal pain fevers vomiting vaginal discharge or bleeding please return to ED for further assessment and care.

## 2019-03-06 NOTE — ED AVS SNAPSHOT
[facility] Emergency Department  [address] [address]  Phone:  [phone]  Fax:  [phone]                                    Meeta Palacios   MRN: [id number]    Department:  [facility] Emergency Department   Date of Visit:  3/6/2019           After Visit Summary Signature Page    I have received my discharge instructions, and my questions have been answered. I have discussed any challenges I see with this plan with the nurse or doctor.    ..........................................................................................................................................  Patient/Patient Representative Signature      ..........................................................................................................................................  Patient Representative Print Name and Relationship to Patient    ..................................................               ................................................  Date                                   Time    ..........................................................................................................................................  Reviewed by Signature/Title    ...................................................              ..............................................  Date                                               Time          22EPIC Rev 08/18

## 2019-03-07 LAB
BACTERIA SPEC CULT: NO GROWTH
Lab: NORMAL
SPECIMEN SOURCE: NORMAL

## 2019-03-07 NOTE — RESULT ENCOUNTER NOTE
Emergency Dept/Urgent Care discharge antibiotic (if prescribed): Amoxicillin-Clavulanate (Augmentin) 875-125 mg PO tablet, 1 tablet by mouth 2 times daily for 5 days  Date of Rx (if applicable):  3/6/19  No changes in treatment per Urine culture protocol.

## 2019-03-08 ENCOUNTER — TELEPHONE (OUTPATIENT)
Dept: EMERGENCY MEDICINE | Facility: CLINIC | Age: 24
End: 2019-03-08

## 2019-03-08 NOTE — RESULT ENCOUNTER NOTE
"Final urine culture report shows \"NO GROWTH\" and is NEGATIVE.  Emergency Dept discharge antibiotic: Amoxicillin-Clavulanate (Augmentin) 875-125 mg PO tablet, 1 tablet by mouth 2 times daily for 5 days  Is ED discharge Rx antibiotic for UTI only (Yes/No): Yes  RN will want to confirm if Patient took antibiotic's prior to urine culture collection.  Recommendations per [facility] ED Lab result protocol - Urine culture protocol."

## 2019-03-08 NOTE — TELEPHONE ENCOUNTER
"[facility] Emergency Department Lab result notification:    Reason for call  Final result notification  Lab Result  Final urine culture report shows \"NO GROWTH\" and is NEGATIVE.  Emergency Dept discharge antibiotic: Amoxicillin-Clavulanate (Augmentin) 875-125 mg PO tablet, 1 tablet by mouth 2 times daily for 5 days  Is ED discharge Rx antibiotic for UTI only (Yes/No): Yes  RN will want to confirm if Patient took antibiotic's prior to urine culture collection.  Recommendations per [facility] ED Lab result protocol - Urine culture protocol.  ED visit Date: 3/6/19  Symptoms reported at ED visit Meeta Palacios is a 23 year old female who is 11 weeks gestation and presents for evaluation of constipation and severe lower abdominal pain beginning yesterday afternoon.  She has noted no bowel movement for 1 week.  She has used Exlax with no relief and continued to push fluids and a high fiber diet.  She was seen by her OB yesterday, she did not have pain at that time, and was advised to use Miralax.  Today, she reports 3 episodes of emesis.  She denies fevers.  She currently rates her pain a 3 out of 10.  She denies headache, visual changes, neck pain, back pain, chest pain, shortness of breath.  She has not had any urinary symptoms.  She denies any vaginal bleeding.  She denies any urinary symptoms or focal numbness weakness any extremity.   Miscellaneous information      Current symptoms  Msg left.     Amira Irvin RN    [facility] RN  Lung Nodule and ED Lab Results F/U RN  Epic pool (ED late result f/u RN) : P [id number]   # [phone]    Copy of Lab result   Order   Urine Culture Aerobic Bacterial [[id number]] (Order [id number])   Exam Information     Exam Date Exam Time Accession # Results    3/6/19  2:52 PM [id number]    Specimen Information: Midstream Urine        Component Collected Lab   Specimen Description 03/06/2019  2:52 PM 75   Midstream Urine    Special Requests 03/06/2019  2:52 PM " 75   Specimen received in preservative    Culture Micro 03/06/2019  2:52 PM 75   No growth
